# Patient Record
Sex: FEMALE | Race: WHITE | NOT HISPANIC OR LATINO | Employment: FULL TIME | URBAN - METROPOLITAN AREA
[De-identification: names, ages, dates, MRNs, and addresses within clinical notes are randomized per-mention and may not be internally consistent; named-entity substitution may affect disease eponyms.]

---

## 2017-05-06 ENCOUNTER — HOSPITAL ENCOUNTER (EMERGENCY)
Facility: CLINIC | Age: 22
Discharge: HOME | End: 2017-05-06
Attending: EMERGENCY MEDICINE | Admitting: EMERGENCY MEDICINE

## 2017-05-06 ENCOUNTER — APPOINTMENT (OUTPATIENT)
Dept: GENERAL RADIOLOGY | Facility: CLINIC | Age: 22
End: 2017-05-06
Attending: EMERGENCY MEDICINE

## 2017-05-06 ENCOUNTER — APPOINTMENT (OUTPATIENT)
Dept: CT IMAGING | Facility: CLINIC | Age: 22
End: 2017-05-06
Attending: EMERGENCY MEDICINE

## 2017-05-06 VITALS
BODY MASS INDEX: 26.58 KG/M2 | OXYGEN SATURATION: 94 % | HEIGHT: 63 IN | SYSTOLIC BLOOD PRESSURE: 114 MMHG | DIASTOLIC BLOOD PRESSURE: 77 MMHG | TEMPERATURE: 98.2 F | RESPIRATION RATE: 18 BRPM | WEIGHT: 150 LBS

## 2017-05-06 DIAGNOSIS — S20.211A CHEST WALL CONTUSION, RIGHT, INITIAL ENCOUNTER: ICD-10-CM

## 2017-05-06 DIAGNOSIS — F10.929 ALCOHOL INTOXICATION, WITH UNSPECIFIED COMPLICATION (H): ICD-10-CM

## 2017-05-06 DIAGNOSIS — S61.219A FINGER LACERATION, INITIAL ENCOUNTER: ICD-10-CM
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
## 2017-05-06 DIAGNOSIS — S06.0X0A CONCUSSION WITHOUT LOSS OF CONSCIOUSNESS, INITIAL ENCOUNTER: ICD-10-CM

## 2017-05-06 LAB
ANION GAP SERPL CALCULATED.3IONS-SCNC: 7 MMOL/L (ref 3–14)
BASOPHILS # BLD AUTO: 0 10E9/L (ref 0–0.2)
BASOPHILS NFR BLD AUTO: 0.3 %
BUN SERPL-MCNC: 9 MG/DL (ref 7–30)
CALCIUM SERPL-MCNC: 9 MG/DL (ref 8.5–10.1)
CHLORIDE SERPL-SCNC: 111 MMOL/L (ref 94–109)
CO2 SERPL-SCNC: 26 MMOL/L (ref 20–32)
CREAT SERPL-MCNC: 0.7 MG/DL (ref 0.52–1.04)
DIFFERENTIAL METHOD BLD: ABNORMAL
EOSINOPHIL # BLD AUTO: 0.4 10E9/L (ref 0–0.7)
EOSINOPHIL NFR BLD AUTO: 6.8 %
ERYTHROCYTE [DISTWIDTH] IN BLOOD BY AUTOMATED COUNT: 13.3 % (ref 10–15)
ETHANOL SERPL-MCNC: 0.22 G/DL
GFR SERPL CREATININE-BSD FRML MDRD: ABNORMAL ML/MIN/1.7M2
GLUCOSE SERPL-MCNC: 89 MG/DL (ref 70–99)
HCG SERPL QL: NEGATIVE
HCT VFR BLD AUTO: 42.4 % (ref 35–47)
HGB BLD-MCNC: 14.4 G/DL (ref 11.7–15.7)
IMM GRANULOCYTES # BLD: 0 10E9/L (ref 0–0.4)
IMM GRANULOCYTES NFR BLD: 0.2 %
INTERPRETATION ECG - MUSE: NORMAL
LYMPHOCYTES # BLD AUTO: 2.6 10E9/L (ref 0.8–5.3)
LYMPHOCYTES NFR BLD AUTO: 39.5 %
MCH RBC QN AUTO: 27.6 PG (ref 26.5–33)
MCHC RBC AUTO-ENTMCNC: 34 G/DL (ref 31.5–36.5)
MCV RBC AUTO: 81 FL (ref 78–100)
MONOCYTES # BLD AUTO: 0.3 10E9/L (ref 0–1.3)
MONOCYTES NFR BLD AUTO: 4 %
NEUTROPHILS # BLD AUTO: 3.2 10E9/L (ref 1.6–8.3)
NEUTROPHILS NFR BLD AUTO: 49.2 %
NRBC # BLD AUTO: 0 10*3/UL
NRBC BLD AUTO-RTO: 0 /100
PLATELET # BLD AUTO: 224 10E9/L (ref 150–450)
POTASSIUM SERPL-SCNC: 3.5 MMOL/L (ref 3.4–5.3)
RBC # BLD AUTO: 5.22 10E12/L (ref 3.8–5.2)
SODIUM SERPL-SCNC: 144 MMOL/L (ref 133–144)
WBC # BLD AUTO: 6.5 10E9/L (ref 4–11)

## 2017-05-06 PROCEDURE — 96361 HYDRATE IV INFUSION ADD-ON: CPT

## 2017-05-06 PROCEDURE — 71101 X-RAY EXAM UNILAT RIBS/CHEST: CPT | Mod: RT

## 2017-05-06 PROCEDURE — 99285 EMERGENCY DEPT VISIT HI MDM: CPT | Mod: 25

## 2017-05-06 PROCEDURE — 85025 COMPLETE CBC W/AUTO DIFF WBC: CPT | Performed by: EMERGENCY MEDICINE

## 2017-05-06 PROCEDURE — 80048 BASIC METABOLIC PNL TOTAL CA: CPT | Performed by: EMERGENCY MEDICINE

## 2017-05-06 PROCEDURE — 96374 THER/PROPH/DIAG INJ IV PUSH: CPT

## 2017-05-06 PROCEDURE — 80320 DRUG SCREEN QUANTALCOHOLS: CPT | Performed by: EMERGENCY MEDICINE

## 2017-05-06 PROCEDURE — 70450 CT HEAD/BRAIN W/O DYE: CPT

## 2017-05-06 PROCEDURE — 84703 CHORIONIC GONADOTROPIN ASSAY: CPT | Performed by: EMERGENCY MEDICINE

## 2017-05-06 PROCEDURE — 93005 ELECTROCARDIOGRAM TRACING: CPT

## 2017-05-06 PROCEDURE — 25000128 H RX IP 250 OP 636: Performed by: EMERGENCY MEDICINE

## 2017-05-06 RX ORDER — KETOROLAC TROMETHAMINE 30 MG/ML
30 INJECTION, SOLUTION INTRAMUSCULAR; INTRAVENOUS ONCE
Status: COMPLETED | OUTPATIENT
Start: 2017-05-06 | End: 2017-05-06

## 2017-05-06 RX ADMIN — KETOROLAC TROMETHAMINE 30 MG: 30 INJECTION, SOLUTION INTRAMUSCULAR at 02:40

## 2017-05-06 RX ADMIN — SODIUM CHLORIDE 1000 ML: 9 INJECTION, SOLUTION INTRAVENOUS at 02:05

## 2017-05-06 ASSESSMENT — ENCOUNTER SYMPTOMS
WEAKNESS: 0
NUMBNESS: 0
HEADACHES: 1
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
## 2017-05-06 NOTE — ED NOTES
Pt ambulated down hallway. Pt walked with a steady gait approximately 50ft. Dr. Simeon was alerted.
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
## 2017-05-06 NOTE — ED AVS SNAPSHOT
Emergency Department    [address] [facility] [address]    Phone:  [phone]    Fax:  [phone]                                       Jackeline Colin   MRN: [id number]    Department:   Emergency Department   Date of Visit:  5/6/2017           After Visit Summary Signature Page     I have received my discharge instructions, and my questions have been answered. I have discussed any challenges I see with this plan with the nurse or doctor.    ..........................................................................................................................................  Patient/Patient Representative Signature      ..........................................................................................................................................  Patient Representative Print Name and Relationship to Patient    ..................................................               ................................................  Date                                            Time    ..........................................................................................................................................  Reviewed by Signature/Title    ...................................................              ..............................................  Date                                                            Time

## 2017-05-12 ENCOUNTER — HOSPITAL ENCOUNTER (EMERGENCY)
Facility: CLINIC | Age: 22
Discharge: HOME | End: 2017-05-12
Attending: EMERGENCY MEDICINE | Admitting: EMERGENCY MEDICINE
Payer: COMMERCIAL

## 2017-05-12 VITALS
DIASTOLIC BLOOD PRESSURE: 80 MMHG | HEIGHT: 64 IN | RESPIRATION RATE: 18 BRPM | SYSTOLIC BLOOD PRESSURE: 120 MMHG | OXYGEN SATURATION: 96 % | HEART RATE: 93 BPM | TEMPERATURE: 97.5 F

## 2017-05-12 DIAGNOSIS — J45.901 ASTHMA EXACERBATION: ICD-10-CM

## 2017-05-12 DIAGNOSIS — Z76.0 ENCOUNTER FOR MEDICATION REFILL: ICD-10-CM

## 2017-05-12 PROCEDURE — 94640 AIRWAY INHALATION TREATMENT: CPT

## 2017-05-12 PROCEDURE — 25000125 ZZHC RX 250

## 2017-05-12 PROCEDURE — 25000132 ZZH RX MED GY IP 250 OP 250 PS 637: Performed by: EMERGENCY MEDICINE

## 2017-05-12 PROCEDURE — 99283 EMERGENCY DEPT VISIT LOW MDM: CPT | Mod: 25

## 2017-05-12 RX ORDER — IPRATROPIUM BROMIDE AND ALBUTEROL SULFATE 2.5; .5 MG/3ML; MG/3ML
3 SOLUTION RESPIRATORY (INHALATION) ONCE
Status: COMPLETED | OUTPATIENT
Start: 2017-05-12 | End: 2017-05-12

## 2017-05-12 RX ORDER — CETIRIZINE HYDROCHLORIDE 10 MG/1
10 TABLET ORAL ONCE
Status: COMPLETED | OUTPATIENT
Start: 2017-05-12 | End: 2017-05-12

## 2017-05-12 RX ORDER — DIPHENHYDRAMINE HCL 25 MG
50 CAPSULE ORAL ONCE
Status: COMPLETED | OUTPATIENT
Start: 2017-05-12 | End: 2017-05-12

## 2017-05-12 RX ORDER — IPRATROPIUM BROMIDE AND ALBUTEROL SULFATE 2.5; .5 MG/3ML; MG/3ML
SOLUTION RESPIRATORY (INHALATION)
Status: COMPLETED
Start: 2017-05-12 | End: 2017-05-12

## 2017-05-12 RX ORDER — ALBUTEROL SULFATE 90 UG/1
2 AEROSOL, METERED RESPIRATORY (INHALATION) EVERY 4 HOURS PRN
Qty: 1 INHALER | Refills: 0 | Status: SHIPPED | OUTPATIENT
Start: 2017-05-12 | End: 2019-10-29

## 2017-05-12 RX ADMIN — CETIRIZINE HYDROCHLORIDE 10 MG: 10 TABLET, FILM COATED ORAL at 05:00

## 2017-05-12 RX ADMIN — IPRATROPIUM BROMIDE AND ALBUTEROL SULFATE 3 ML: .5; 3 SOLUTION RESPIRATORY (INHALATION) at 04:37

## 2017-05-12 RX ADMIN — DIPHENHYDRAMINE HYDROCHLORIDE 25 MG: 25 CAPSULE ORAL at 04:49

## 2017-05-12 RX ADMIN — IPRATROPIUM BROMIDE AND ALBUTEROL SULFATE 3 ML: 2.5; .5 SOLUTION RESPIRATORY (INHALATION) at 04:37

## 2017-05-12 ASSESSMENT — ENCOUNTER SYMPTOMS
FEVER: 0
MYALGIAS: 0
CHILLS: 0
SHORTNESS OF BREATH: 1
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
## 2017-05-12 NOTE — DISCHARGE INSTRUCTIONS
"  Asthma (Adult)  Asthma is a disease where the medium and  small air passages within the lung go into spasm and restrict the flow of air. Inflammation and swelling of the airways cause further restriction. During an acute asthma attack, these factors cause difficulty breathing, wheezing, cough and chest tightness.    An asthma attack can be triggered by many things. Common triggers include infections such as the common cold, bronchitis, pneumonia. Irritants such as smoke or pollutants in the air, emotional upset, and exercise can also trigger an attack. In many adults with asthma, allergies to dust, mold, pollen and animal dander can cause an asthma attack. Skipping doses of daily asthma medicine can also bring on an asthma attack.  Asthma can be controlled using the proper medicines prescribed by your healthcare provider and avoiding exposure to known triggers including allergens and irritants.  Home care    Take prescribed medicine exactly at the times advised. If you need medicine such as from a hand held inhaler or aerosol breathing machine more than every 4 hours, contact your healthcare provider or seek immediate medical attention. If prescribed an antibiotic or prednisone, take all of the medicine as prescribed, even if you are feeling better after a few days.    Do not smoke. Avoid being exposed to the smoke of others.    Some people with asthma have worsening of their symptoms when they take aspirin and non-steroidal or fever-reducing medicines like ibuprofen and naproxen. Talk to your healthcare provider if you think this may apply to you.  Follow-up care  Follow up with your healthcare provider, or as advised. Always bring all of your current medicines to any appointments with your healthcare provider. Also bring a complete list of medications even those not taken for asthma. If you do not already have one, talk to your healthcare provider about developing a personalized \"Asthma Action Plan.\"  A " pneumococcal (pneumonia) vaccine and yearly flu shot (every fall) are recommended. Ask your doctor about this.  When to seek medical advice  Call your healthcare provider right away if any of these occur:     Increased wheezing or shortness of breath    Need to use your inhalers more often than usual without relief    Fever of 100.4 F (38 C) or higher, or as directed by your healthcare provider    Coughing up lots of dark-colored or bloody sputum (mucus)    Chest pain with each breath    If you use a peak flow meter as part of an Asthma Action Plan, and you are still in the yellow zone (50% to 80%) 15 minutes after using inhaler medicine.  Call 911  Call 911 if any of the following occur    Trouble walking or talking because of shortness of breath    If you use a peak flow meter as part of an Asthma Action Plan and you are still in the red zone (less than 50%) 15 minutes after using inhaler medicine    Lips or fingernails turning gray or blue    9207-6679 [employer]. [address] [place] [address]. All rights reserved. This information is not intended as a substitute for professional medical care. Always follow your healthcare professional's instructions.          Controlling Asthma Triggers: Animals     Keep pets off beds and upholstered furniture.     Having allergies to animals can trigger asthma flare-ups. Your health care provider may test you for allergies to several types of animals. The allergies are caused by an animal s dander (dry skin flakes), feathers, droppings, urine, and saliva. If you are allergic to pets, there are some things you can do to lessen your symptoms.  Keeping Your Home Clean    Dust often with a damp cloth to lessen pet dander.    Keep your pets off of your bed and out of your bedroom. You spend a big part of each day there sleeping.    Keep your pets off of upholstered furniture, like sofas and chairs, and out of rooms with carpeting.    Use a special bag for  your vacuum or use a vacuum designed to lessen pet dander and hair on carpeting.    Think about buying a home air  with a HEPA (high-efficiency particulate air) filter. They help to remove allergens in the air.  Away From Home    You may have to avoid homes with pets. Even if the pets are outdoors during a visit, the dander remains.    When spending the night somewhere, ask to sleep in a room where pets aren't allowed.  Choosing a Pet  If you are considering a new pet, there are some things you should know:    For people with allergies to dogs and cats, it is not the length of the fur or coat or the amount of shedding that matters. All dogs and cats have dander. There aren't dogs and cats that are completely allergen-free.    Fish and reptiles do not cause allergies.    0835-6891 [employer]. [address] [place] [address], [place] [address]. All rights reserved. This information is not intended as a substitute for professional medical care. Always follow your healthcare professional's instructions.

## 2017-05-12 NOTE — ED AVS SNAPSHOT
Emergency Department    [address] [facility] [address]    Phone:  [phone]    Fax:  [phone]                                       Jackeline Colin   MRN: [id number]    Department:   Emergency Department   Date of Visit:  5/12/2017           Patient Information     Date Of Birth          1995        Your diagnoses for this visit were:     Asthma exacerbation     Encounter for medication refill        You were seen by Abner Headley MD.      Follow-up Information     Follow up with Clinic, [facility]. Schedule an appointment as soon as possible for a visit in 1 week.    Contact information:    [address]  [facility] [id number] [phone]          Follow up with  Emergency Department.    Specialty:  EMERGENCY MEDICINE    Why:  If symptoms worsen    Contact information:    [address] [facility] [id number] [phone]        Discharge Instructions         Asthma (Adult)  Asthma is a disease where the medium and  small air passages within the lung go into spasm and restrict the flow of air. Inflammation and swelling of the airways cause further restriction. During an acute asthma attack, these factors cause difficulty breathing, wheezing, cough and chest tightness.    An asthma attack can be triggered by many things. Common triggers include infections such as the common cold, bronchitis, pneumonia. Irritants such as smoke or pollutants in the air, emotional upset, and exercise can also trigger an attack. In many adults with asthma, allergies to dust, mold, pollen and animal dander can cause an asthma attack. Skipping doses of daily asthma medicine can also bring on an asthma attack.  Asthma can be controlled using the proper medicines prescribed by your healthcare provider and avoiding exposure to known triggers including allergens and irritants.  Home care    Take prescribed medicine exactly at the times advised. If you  "need medicine such as from a hand held inhaler or aerosol breathing machine more than every 4 hours, contact your healthcare provider or seek immediate medical attention. If prescribed an antibiotic or prednisone, take all of the medicine as prescribed, even if you are feeling better after a few days.    Do not smoke. Avoid being exposed to the smoke of others.    Some people with asthma have worsening of their symptoms when they take aspirin and non-steroidal or fever-reducing medicines like ibuprofen and naproxen. Talk to your healthcare provider if you think this may apply to you.  Follow-up care  Follow up with your healthcare provider, or as advised. Always bring all of your current medicines to any appointments with your healthcare provider. Also bring a complete list of medications even those not taken for asthma. If you do not already have one, talk to your healthcare provider about developing a personalized \"Asthma Action Plan.\"  A pneumococcal (pneumonia) vaccine and yearly flu shot (every fall) are recommended. Ask your doctor about this.  When to seek medical advice  Call your healthcare provider right away if any of these occur:     Increased wheezing or shortness of breath    Need to use your inhalers more often than usual without relief    Fever of 100.4 F (38 C) or higher, or as directed by your healthcare provider    Coughing up lots of dark-colored or bloody sputum (mucus)    Chest pain with each breath    If you use a peak flow meter as part of an Asthma Action Plan, and you are still in the yellow zone (50% to 80%) 15 minutes after using inhaler medicine.  Call 911  Call 911 if any of the following occur    Trouble walking or talking because of shortness of breath    If you use a peak flow meter as part of an Asthma Action Plan and you are still in the red zone (less than 50%) 15 minutes after using inhaler medicine    Lips or fingernails turning gray or blue    8599-5167 [employer], " [employer] [address] [place] [address]. All rights reserved. This information is not intended as a substitute for professional medical care. Always follow your healthcare professional's instructions.          Controlling Asthma Triggers: Animals     Keep pets off beds and upholstered furniture.     Having allergies to animals can trigger asthma flare-ups. Your health care provider may test you for allergies to several types of animals. The allergies are caused by an animal s dander (dry skin flakes), feathers, droppings, urine, and saliva. If you are allergic to pets, there are some things you can do to lessen your symptoms.  Keeping Your Home Clean    Dust often with a damp cloth to lessen pet dander.    Keep your pets off of your bed and out of your bedroom. You spend a big part of each day there sleeping.    Keep your pets off of upholstered furniture, like sofas and chairs, and out of rooms with carpeting.    Use a special bag for your vacuum or use a vacuum designed to lessen pet dander and hair on carpeting.    Think about buying a home air  with a HEPA (high-efficiency particulate air) filter. They help to remove allergens in the air.  Away From Home    You may have to avoid homes with pets. Even if the pets are outdoors during a visit, the dander remains.    When spending the night somewhere, ask to sleep in a room where pets aren't allowed.  Choosing a Pet  If you are considering a new pet, there are some things you should know:    For people with allergies to dogs and cats, it is not the length of the fur or coat or the amount of shedding that matters. All dogs and cats have dander. There aren't dogs and cats that are completely allergen-free.    Fish and reptiles do not cause allergies.    1332-8276 [employer]. [address] [place] [address]. All rights reserved. This information is not intended as a substitute for professional medical care. Always follow your  healthcare professional's instructions.          24 Hour Appointment Hotline       To make an appointment at any [facility], call [phone] ([phone]). If you don't have a family doctor or clinic, we will help you find one. [facility] are conveniently located to serve the needs of you and your family.             Review of your medicines      CONTINUE these medicines which may have CHANGED, or have new prescriptions. If we are uncertain of the size of tablets/capsules you have at home, strength may be listed as something that might have changed.        Dose / Directions Last dose taken    * albuterol 108 (90 BASE) MCG/ACT Inhaler   Commonly known as:  albuterol   Dose:  2 puff   What changed:  Another medication with the same name was added. Make sure you understand how and when to take each.   Quantity:  1 Inhaler        Inhale 2 puffs into the lungs 4 times daily as needed for shortness of breath / dyspnea or wheezing   Refills:  0        * albuterol 108 (90 BASE) MCG/ACT Inhaler   Commonly known as:  albuterol   Dose:  2 puff   What changed:  You were already taking a medication with the same name, and this prescription was added. Make sure you understand how and when to take each.   Quantity:  1 Inhaler        Inhale 2 puffs into the lungs every 4 hours as needed for shortness of breath / dyspnea   Refills:  0        * Notice:  This list has 2 medication(s) that are the same as other medications prescribed for you. Read the directions carefully, and ask your doctor or other care provider to review them with you.            Prescriptions were sent or printed at these locations (1 Prescription)                   Other Prescriptions                Printed at Department/Unit printer (1 of 1)         albuterol (ALBUTEROL) 108 (90 BASE) MCG/ACT Inhaler                Orders Needing Specimen Collection     None      Pending Results     No orders found from 5/10/2017 to 5/13/2017.             Pending Culture Results     No orders found from 5/10/2017 to 5/13/2017.            Pending Results Instructions     If you had any lab results that were not finalized at the time of your Discharge, you can call the ED Lab Result RN at [phone]. You will be contacted by this team for any positive Lab results or changes in treatment. The nurses are available 7 days a week from 10A to 6:30P.  You can leave a message 24 hours per day and they will return your call.        Test Results From Your Hospital Stay               Clinical Quality Measure: Blood Pressure Screening     Your blood pressure was checked while you were in the emergency department today. The last reading we obtained was  BP: 120/80 . Please read the guidelines below about what these numbers mean and what you should do about them.  If your systolic blood pressure (the top number) is less than 120 and your diastolic blood pressure (the bottom number) is less than 80, then your blood pressure is normal. There is nothing more that you need to do about it.  If your systolic blood pressure (the top number) is 120-139 or your diastolic blood pressure (the bottom number) is 80-89, your blood pressure may be higher than it should be. You should have your blood pressure rechecked within a year by a primary care provider.  If your systolic blood pressure (the top number) is 140 or greater or your diastolic blood pressure (the bottom number) is 90 or greater, you may have high blood pressure. High blood pressure is treatable, but if left untreated over time it can put you at risk for heart attack, stroke, or kidney failure. You should have your blood pressure rechecked by a primary care provider within the next 4 weeks.  If your provider in the emergency department today gave you specific instructions to follow-up with your doctor or provider even sooner than that, you should follow that instruction and not wait for up to 4 weeks for your follow-up  "visit.        Thank you for choosing [facility]       Thank you for choosing [facility] for your care. Our goal is always to provide you with excellent care. Hearing back from our patients is one way we can continue to improve our services. Please take a few minutes to complete the written survey that you may receive in the mail after you visit with us. Thank you!        [employer] Information     [employer] lets you send messages to your doctor, view your test results, renew your prescriptions, schedule appointments and more. To sign up, go to [email or web address] . Click on \"Log in\" on the left side of the screen, which will take you to the Welcome page. Then click on \"Sign up Now\" on the right side of the page.     You will be asked to enter the access code listed below, as well as some personal information. Please follow the directions to create your username and password.     Your access code is: [email or web address]  Expires: 2017  3:22 AM     Your access code will  in 90 days. If you need help or a new code, please call your [facility] clinic or [phone].        Care EveryWhere ID     This is your Care EveryWhere ID. This could be used by other organizations to access your [facility] medical records  [id number]        After Visit Summary       This is your record. Keep this with you and show to your community pharmacist(s) and doctor(s) at your next visit.                  "

## 2017-05-12 NOTE — ED AVS SNAPSHOT
Emergency Department    [address] [facility] [address]    Phone:  [phone]    Fax:  [phone]                                       Jackeline Colin   MRN: [id number]    Department:   Emergency Department   Date of Visit:  5/12/2017           After Visit Summary Signature Page     I have received my discharge instructions, and my questions have been answered. I have discussed any challenges I see with this plan with the nurse or doctor.    ..........................................................................................................................................  Patient/Patient Representative Signature      ..........................................................................................................................................  Patient Representative Print Name and Relationship to Patient    ..................................................               ................................................  Date                                            Time    ..........................................................................................................................................  Reviewed by Signature/Title    ...................................................              ..............................................  Date                                                            Time

## 2017-07-17 ENCOUNTER — OFFICE VISIT (OUTPATIENT)
Dept: URGENT CARE | Facility: URGENT CARE | Age: 22
End: 2017-07-17
Payer: COMMERCIAL

## 2017-07-17 VITALS
TEMPERATURE: 97.7 F | BODY MASS INDEX: 26.78 KG/M2 | SYSTOLIC BLOOD PRESSURE: 120 MMHG | HEART RATE: 106 BPM | DIASTOLIC BLOOD PRESSURE: 70 MMHG | WEIGHT: 156 LBS | OXYGEN SATURATION: 98 %

## 2017-07-17 DIAGNOSIS — J45.901 ASTHMA EXACERBATION: Primary | ICD-10-CM

## 2017-07-17 DIAGNOSIS — J30.2 SEASONAL ALLERGIC RHINITIS, UNSPECIFIED CHRONICITY, UNSPECIFIED TRIGGER: ICD-10-CM

## 2017-07-17 PROCEDURE — 94640 AIRWAY INHALATION TREATMENT: CPT | Performed by: FAMILY MEDICINE

## 2017-07-17 PROCEDURE — 99214 OFFICE O/P EST MOD 30 MIN: CPT | Mod: 25 | Performed by: FAMILY MEDICINE

## 2017-07-17 RX ORDER — PREDNISONE 20 MG/1
20 TABLET ORAL 2 TIMES DAILY
Qty: 8 TABLET | Refills: 0 | Status: SHIPPED | OUTPATIENT
Start: 2017-07-18 | End: 2017-07-22

## 2017-07-17 RX ORDER — ALBUTEROL SULFATE 0.83 MG/ML
SOLUTION RESPIRATORY (INHALATION)
Qty: 1 VIAL | Refills: 0
Start: 2017-07-17 | End: 2019-10-29

## 2017-07-17 RX ORDER — PREDNISONE 20 MG/1
TABLET ORAL
Qty: 2 TABLET | Refills: 0
Start: 2017-07-17 | End: 2017-07-18

## 2017-07-17 RX ORDER — LORATADINE 10 MG/1
10 CAPSULE, LIQUID FILLED ORAL DAILY
COMMUNITY

## 2017-07-17 NOTE — NURSING NOTE
"Chief Complaint   Patient presents with     Asthma     Pt reports possible asthma exacerbation which started last night.     Urgent Care       Initial /70  Pulse 106  Temp 97.7  F (36.5  C)  Wt 156 lb (70.8 kg)  SpO2 98%  BMI 26.78 kg/m2 Estimated body mass index is 26.78 kg/(m^2) as calculated from the following:    Height as of 5/12/17: 5' 4\" (1.626 m).    Weight as of this encounter: 156 lb (70.8 kg).  Medication Reconciliation: complete    "

## 2017-07-17 NOTE — PROGRESS NOTES
SUBJECTIVE: Jackeline Colin is a 21 year old female presenting with a chief complaint of cough  and SOB/wheezing.  Onset of symptoms was 1 day(s) ago.  Course of illness is worsening.    Severity moderately severe  Current and Associated symptoms: allergy symptoms  Treatment measures tried include left inhaler at a friends house yesterday.  Predisposing factors include HX of asthma and seasonal allergies.    Past Medical History:   Diagnosis Date     Tonsil, abscess 6/6/2014    peritonsilar abscess     Uncomplicated asthma        Past Surgical History:   Procedure Laterality Date     NO HISTORY OF SURGERY         Family History   Problem Relation Age of Onset     Hypertension Mother      Hyperlipidemia Mother        Social History   Substance Use Topics     Smoking status: Never Smoker     Smokeless tobacco: Never Used     Alcohol use Yes     Review Of Systems  Skin: negative  Eyes: negative  Ears/Nose/Throat: negative  Respiratory: as above  Cardiovascular: negative  Gastrointestinal: negative  Genitourinary: negative  Musculoskeletal: negative  Neurologic: negative  Psychiatric: negative  Hematologic/Lymphatic/Immunologic: negative  Endocrine: negative      OBJECTIVE:  /70  Pulse 106  Temp 97.7  F (36.5  C)  Wt 156 lb (70.8 kg)  SpO2 98%  BMI 26.78 kg/m2    GENERAL APPEARANCE: healthy, alert and no distress  EYES: EOMI,  PERRL, conjunctiva clear  HENT: ear canals and TM's normal.  Nose and mouth without ulcers, erythema or lesions  NECK: supple, nontender, no lymphadenopathy  RESP: expiratory wheezes throughout  CV: regular rates and rhythm, normal S1 S2, no murmur noted  SKIN: no suspicious lesions or rashes      ICD-10-CM    1. Asthma exacerbation J45.901 albuterol (2.5 MG/3ML) 0.083% neb solution     INHALATION/NEBULIZER TREATMENT, INITIAL     predniSONE (DELTASONE) 20 MG tablet   2. Seasonal allergic rhinitis, unspecified chronicity, unspecified trigger J30.2      Pt will retrieve alb  inhaler this am  She felt much improved at time of dc and there was less wheezing  Fluids/Rest, f/u if worse/not any better  She will switch her claritin to Zyrtec or Allegra

## 2017-07-17 NOTE — MR AVS SNAPSHOT
"              After Visit Summary   2017    Jackeline Colin    MRN: [id number]           Patient Information     Date Of Birth          1995        Visit Information        Provider Department      2017 10:05 AM Db Almeida DO [facility]        Today's Diagnoses     Asthma exacerbation    -  1    Seasonal allergic rhinitis, unspecified chronicity, unspecified trigger           Follow-ups after your visit        Who to contact     If you have questions or need follow up information about today's clinic visit or your schedule please contact [facility] directly at [phone].  Normal or non-critical lab and imaging results will be communicated to you by walihart, letter or phone within 4 business days after the clinic has received the results. If you do not hear from us within 7 days, please contact the clinic through walihart or phone. If you have a critical or abnormal lab result, we will notify you by phone as soon as possible.  Submit refill requests through [employer] or call your pharmacy and they will forward the refill request to us. Please allow 3 business days for your refill to be completed.          Additional Information About Your Visit        MyChart Information     [employer] lets you send messages to your doctor, view your test results, renew your prescriptions, schedule appointments and more. To sign up, go to [email or web address] . Click on \"Log in\" on the left side of the screen, which will take you to the Welcome page. Then click on \"Sign up Now\" on the right side of the page.     You will be asked to enter the access code listed below, as well as some personal information. Please follow the directions to create your username and password.     Your access code is: [email or web address]  Expires: 2017  3:22 AM     Your access code will  in 90 days. If you need help or a new code, please call your [place] clinic or " [phone].        Care EveryWhere ID     This is your Care EveryWhere ID. This could be used by other organizations to access your [facility] medical records  [id number]        Your Vitals Were     Pulse Temperature Pulse Oximetry BMI (Body Mass Index)          106 97.7  F (36.5  C) 98% 26.78 kg/m2         Blood Pressure from Last 3 Encounters:   07/17/17 120/70   05/12/17 120/80   05/06/17 114/77    Weight from Last 3 Encounters:   07/17/17 156 lb (70.8 kg)   05/06/17 150 lb (68 kg)   12/12/16 140 lb (63.5 kg)              We Performed the Following     INHALATION/NEBULIZER TREATMENT, INITIAL          Today's Medication Changes          These changes are accurate as of: 7/17/17 10:45 AM.  If you have any questions, ask your nurse or doctor.               Start taking these medicines.        Dose/Directions    * predniSONE 20 MG tablet   Commonly known as:  DELTASONE   Used for:  Asthma exacerbation   Started by:  Db Almeida DO        40mg prednisone in clinic   Quantity:  2 tablet   Refills:  0       * predniSONE 20 MG tablet   Commonly known as:  DELTASONE   Used for:  Asthma exacerbation   Started by:  Db Almeida DO        Dose:  20 mg   Start taking on:  7/18/2017   Take 1 tablet (20 mg) by mouth 2 times daily for 4 days   Quantity:  8 tablet   Refills:  0       * Notice:  This list has 2 medication(s) that are the same as other medications prescribed for you. Read the directions carefully, and ask your doctor or other care provider to review them with you.      These medicines have changed or have updated prescriptions.        Dose/Directions    * albuterol 108 (90 BASE) MCG/ACT Inhaler   Commonly known as:  albuterol   This may have changed:  Another medication with the same name was added. Make sure you understand how and when to take each.        Dose:  2 puff   Inhale 2 puffs into the lungs every 4 hours as needed for shortness of breath / dyspnea   Quantity:  1 Inhaler   Refills:  0        * albuterol (2.5 MG/3ML) 0.083% neb solution   This may have changed:  You were already taking a medication with the same name, and this prescription was added. Make sure you understand how and when to take each.   Used for:  Asthma exacerbation   Changed by:  Db Almeida, DO        1 neb in clinic   Quantity:  1 vial   Refills:  0       * Notice:  This list has 2 medication(s) that are the same as other medications prescribed for you. Read the directions carefully, and ask your doctor or other care provider to review them with you.         Where to get your medicines      These medications were sent to [facility]/pharmacy #[id number] - [place], [place] - [address]  [address] [address]     Phone:  [phone]     predniSONE 20 MG tablet         Some of these will need a paper prescription and others can be bought over the counter.  Ask your nurse if you have questions.     You don't need a prescription for these medications     albuterol (2.5 MG/3ML) 0.083% neb solution    predniSONE 20 MG tablet                Primary Care Provider Office Phone # Fax #    [facility] [phone]       [address]  [facility] [address]        Equal Access to Services     KINGSLEY THOMAS AH: Hadii florentino tavarez hadasho Soomaali, waaxda luqadaha, qaybta kaalmada adeegyada, jemal reza. So [facility] [phone].    ATENCIÓN: Si habla español, tiene a godinez disposición servicios gratuitos de asistencia lingüística. Llame al [phone].    We comply with applicable federal civil rights laws and [place] laws. We do not discriminate on the basis of race, color, national origin, age, disability sex, sexual orientation or gender identity.            Thank you!     Thank you for choosing [facility]  for your care. Our goal is always to provide you with excellent care. Hearing back from our patients is one way we can continue to improve our  services. Please take a few minutes to complete the written survey that you may receive in the mail after your visit with us. Thank you!             Your Updated Medication List - Protect others around you: Learn how to safely use, store and throw away your medicines at www.disposemymeds.org.          This list is accurate as of: 7/17/17 10:45 AM.  Always use your most recent med list.                   Brand Name Dispense Instructions for use Diagnosis    * albuterol 108 (90 BASE) MCG/ACT Inhaler    albuterol    1 Inhaler    Inhale 2 puffs into the lungs every 4 hours as needed for shortness of breath / dyspnea        * albuterol (2.5 MG/3ML) 0.083% neb solution     1 vial    1 neb in clinic    Asthma exacerbation       CLARITIN 10 MG capsule   Generic drug:  loratadine      Take 10 mg by mouth daily        * predniSONE 20 MG tablet    DELTASONE    2 tablet    40mg prednisone in clinic    Asthma exacerbation       * predniSONE 20 MG tablet   Start taking on:  7/18/2017    DELTASONE    8 tablet    Take 1 tablet (20 mg) by mouth 2 times daily for 4 days    Asthma exacerbation       * Notice:  This list has 4 medication(s) that are the same as other medications prescribed for you. Read the directions carefully, and ask your doctor or other care provider to review them with you.

## 2017-09-22 ENCOUNTER — HOSPITAL ENCOUNTER (EMERGENCY)
Facility: CLINIC | Age: 22
Discharge: HOME | End: 2017-09-22
Attending: NURSE PRACTITIONER | Admitting: NURSE PRACTITIONER
Payer: COMMERCIAL

## 2017-09-22 VITALS
OXYGEN SATURATION: 96 % | DIASTOLIC BLOOD PRESSURE: 87 MMHG | HEART RATE: 105 BPM | RESPIRATION RATE: 16 BRPM | SYSTOLIC BLOOD PRESSURE: 135 MMHG | TEMPERATURE: 98.1 F

## 2017-09-22 DIAGNOSIS — J06.9 URI WITH COUGH AND CONGESTION: ICD-10-CM

## 2017-09-22 DIAGNOSIS — H66.90 ACUTE OTITIS MEDIA, UNSPECIFIED LATERALITY, UNSPECIFIED OTITIS MEDIA TYPE: ICD-10-CM

## 2017-09-22 PROCEDURE — 99282 EMERGENCY DEPT VISIT SF MDM: CPT

## 2017-09-22 RX ORDER — AMOXICILLIN 500 MG/1
500 CAPSULE ORAL 3 TIMES DAILY
Qty: 21 CAPSULE | Refills: 0 | Status: SHIPPED | OUTPATIENT
Start: 2017-09-22 | End: 2017-09-29

## 2017-09-22 ASSESSMENT — ENCOUNTER SYMPTOMS
COUGH: 1
FEVER: 0
SORE THROAT: 0
NECK PAIN: 0
ABDOMINAL PAIN: 0
SINUS PRESSURE: 1

## 2017-09-22 NOTE — ED AVS SNAPSHOT
Emergency Department    [address] [address]    Phone:  [phone]    Fax:  [phone]                                       Jackeline Colin   MRN: [id number]    Department:   Emergency Department   Date of Visit:  9/22/2017           After Visit Summary Signature Page     I have received my discharge instructions, and my questions have been answered. I have discussed any challenges I see with this plan with the nurse or doctor.    ..........................................................................................................................................  Patient/Patient Representative Signature      ..........................................................................................................................................  Patient Representative Print Name and Relationship to Patient    ..................................................               ................................................  Date                                            Time    ..........................................................................................................................................  Reviewed by Signature/Title    ...................................................              ..............................................  Date                                                            Time

## 2017-09-22 NOTE — ED AVS SNAPSHOT
Emergency Department    [address] [facility] [address]    Phone:  [phone]    Fax:  [phone]                                       Jackeline Colin   MRN: [id number]    Department:   Emergency Department   Date of Visit:  9/22/2017           Patient Information     Date Of Birth          1995        Your diagnoses for this visit were:     URI with cough and congestion     Acute otitis media, unspecified laterality, unspecified otitis media type right       You were seen by Ghazal Zaman, CNP.      Follow-up Information     Follow up with Clinic, [facility] In 1 week.    [address]:    [address]  [facility] [address] [phone]          Follow up with  Emergency Department.    Specialty:  EMERGENCY MEDICINE    Why:  As needed, If symptoms worsen    Contact information:    [address] [facility] [id number] [phone]        Discharge Instructions         Decongestants may help you feel better. Y ou may use decongestant nose sprays Afrin  (oxymetazoline) or Prasanth-Synephrine  (phenylephrine hydrochloride) for up to 3 days, or may use a decongestant tablet like Sudafed  (pseudoephedrine).    You may also use Saline Nasal Spray 3-4 times daily for 1 week.       Otitis Media (Middle-Ear Infection) in Adults  Otitis media is another name for a middle-ear infection. It means an infection behind your eardrum. This kind of ear infection can happen after any condition that keeps fluid from draining from the middle ear. These conditions include allergies, a cold, a sore throat, or a respiratory infection.  Middle-ear infections are common in children, but they can also happen in adults. An ear infection in an adult may mean a more serious problem than in a child. So you may need additional tests. If you have an ear infection, you should see your health care provider for treatment.  What are the types of middle-ear  infections?  Infections can affect the middle ear in several ways. They are:    Acute otitis media. This middle-ear infection occurs suddenly. It causes swelling and redness. Fluid and mucus become trapped inside the ear. You can have a fever and ear pain.    Otitis media with effusion. Fluid (effusion) and mucus build up in the middle ear after the infection goes away. You may feel like your middle ear is full. This can continue for months and may affect your hearing.    Chronic otitis media with effusion. Fluid (effusion) remains in the middle ear for a long time. Or it builds up again and again, even though there is no infection. This type of middle-ear infection may be hard to treat. It may also affect your hearing.  Who is more likely to get a middle-ear infection?  You are more likely to get an ear infection if you:    Smoke or are around someone who smokes    Have seasonal or year-round allergy symptoms    Have a cold or other upper respiratory infection  What causes a middle-ear infection?  The middle ear connects to the throat by a canal called the eustachian tube. This tube helps even out the pressure between the outer ear and the inner ear. A cold or allergy can irritate the tube or cause the area around it to swell. This can keep fluid from draining from the middle ear. The fluid builds up behind the eardrum. Bacteria and viruses can grow in this fluid. The bacteria and viruses cause the middle-ear infection.  What are the symptoms of a middle-ear infection?  Common symptoms of a middle-ear infection in adults are:    Pain in 1 or both ears    Drainage from the ear    Muffled hearing    Sore throat   You may also have a fever. Rarely, your balance can be affected.  These symptoms may be the same as for other conditions. It s important to talk with your health care provider if you think you have a middle-ear infection. If you have a high fever, severe pain behind your ear, or paralysis in your face, see  your provider as soon as you can.  How is a middle-ear infection diagnosed?  Your health care provider will take a medical history and do a physical exam. He or she will look at the outer ear and eardrum with an otoscope. The otoscope is a lighted tool that lets your provider see inside the ear. A pneumatic otoscope blows a puff of air into the ear to check how well your eardrum moves. If you eardrum doesn t move well, it may mean you have fluid behind it.  Your provider may also do a test called tympanometry. This test tells how well the middle ear is working. It can find any changes in pressure in the middle ear. Your provider may test your hearing with a tuning fork.  How is a middle-ear infection treated?  A middle-ear infection may be treated with:    Antibiotics, taken by mouth or as ear drops    Medication for pain    Decongestants, antihistamines, or nasal steroids  Your health care provider may also have you try autoinsufflation. This helps adjust the air pressure in your ear. For this, you pinch your nose and gently exhale. This forces air back through the eustachian tube.  The exact treatment for your ear infection will depend on the type of infection you have. In general, if your symptoms don t get better in 48 to 72 hours, contact your health care provider.  Middle-ear infections can cause long-term problems if not treated. They can lead to:    Infection in other parts of the head    Permanent hearing loss    Paralysis of a nerve in your face  If you have a middle-ear infection that doesn t get better, you may need to see an ear, nose, and throat specialist (otolaryngologist). You may need a CT scan or MRI to check for head and neck cancer.  Ear tubes  Sometimes fluid stays in the middle ear even after you take antibiotics and the infection goes away. In this case, your health care provider may suggest that a small tube be placed in your ear. The tube is put at the opening of the eardrum. The tube keeps  fluid from building up and relieves pressure in the middle ear. It can also help you hear better. This surgery is called myringotomy. It is not often done in adults.  The tubes usually fall out on their own after 6 months to a year.    9237-2381 [employer]. [address] [place] [address]. All rights reserved. This information is not intended as a substitute for professional medical care. Always follow your healthcare professional's instructions.      Discharge Instructions  Upper Respiratory Infection    The upper respiratory tract includes the sinuses, nasal passages, pharynx, and larynx. A URI, or upper respiratory infection, is an infection of any of the parts of the upper airway. Symptoms include runny nose, congestion, sneezing, sore throat, cough, and fever. URIs are almost always caused by a virus. Antibiotics do not help with viral infections, so are generally not prescribed. A URI is very contagious through coughing and nasal secretions; make sure you wash your hands often and clean surfaces after sneezing, coughing or touching them. While you should start to improve in 3 - 5 days, remember that sometimes a cough can linger for several weeks.    Generally, every Emergency Department visit should have a follow-up clinic visit with either a primary or a specialty clinic/provider. Please follow-up as instructed by your emergency provider today.    Return to the Emergency Department if:    Any of your symptoms get much worse.    You seem very sick, like being too weak to get up.    You have chest pain or shortness of breath.     You have a severe headache.    You are vomiting (throwing up) so much you cannot keep fluids or medicines down.    You have confusion or seem unusually drowsy.    You have a seizure.    What can I do to help myself?    Fill any prescriptions the provider gave you and take them right away    If you have a fever, get plenty of rest and drink lots of fluids,  especially water.    Using a humidifier or saline nose spray will also help loosen mucous.     Clothes or blankets will not change your fever. Do what is comfortable for you.    Bathing or sponging in lukewarm water may help you feel better.    Acetaminophen (Tylenol ) or ibuprofen (Advil , Motrin ) will help bring fever down and may help you feel more comfortable. Be sure to read and follow the package directions, and ask your provider if you have questions.    Do not drink alcohol.    Decongestants may help you feel better. You may use decongestant nose sprays Afrin  (oxymetazoline) or Prasanth-Synephrine  (phenylephrine hydrochloride) for up to 3 days, or may use a decongestant tablet like Sudafed  (pseudoephedrine).  If you were given a prescription for medicine here today, be sure to read all of the information (including the package insert) that comes with your prescription.  This will include important information about the medicine, its side effects, and any warnings that you need to know about.  The pharmacist who fills the prescription can provide more information and answer questions you may have about the medicine.  If you have questions or concerns that the pharmacist cannot address, please call or return to the Emergency Department.   Remember that you can always come back to the Emergency Department if you are not able to see your regular provider in the amount of time listed above, if you get any new symptoms, or if there is anything that worries you.    24 Hour Appointment Hotline       To make an appointment at any [facility], call [phone] ([phone]). If you don't have a family doctor or clinic, we will help you find one. [facility] clinics are conveniently located to serve the needs of you and your family.             Review of your medicines      START taking        Dose / Directions Last dose taken    amoxicillin 500 MG capsule   Commonly known as:  AMOXIL   Dose:  500 mg   Quantity:   21 capsule        Take 1 capsule (500 mg) by mouth 3 times daily for 7 days   Refills:  0          Our records show that you are taking the medicines listed below. If these are incorrect, please call your family doctor or clinic.        Dose / Directions Last dose taken    albuterol 108 (90 BASE) MCG/ACT Inhaler   Commonly known as:  PROAIR HFA   Dose:  2 puff   Quantity:  1 Inhaler        Inhale 2 puffs into the lungs every 4 hours as needed for shortness of breath / dyspnea   Refills:  0        CLARITIN 10 MG capsule   Dose:  10 mg   Generic drug:  loratadine        Take 10 mg by mouth daily   Refills:  0                Prescriptions were sent or printed at these locations (1 Prescription)                   Other Prescriptions                Printed at Department/Unit printer (1 of 1)         amoxicillin (AMOXIL) 500 MG capsule                Orders Needing Specimen Collection     None      Pending Results     No orders found from 9/20/2017 to 9/23/2017.            Pending Culture Results     No orders found from 9/20/2017 to 9/23/2017.            Pending Results Instructions     If you had any lab results that were not finalized at the time of your Discharge, you can call the ED Lab Result RN at [phone]. You will be contacted by this team for any positive Lab results or changes in treatment. The nurses are available 7 days a week from 10A to 6:30P.  You can leave a message 24 hours per day and they will return your call.        Test Results From Your Hospital Stay               Clinical Quality Measure: Blood Pressure Screening     Your blood pressure was checked while you were in the emergency department today. The last reading we obtained was  BP: 135/87 . Please read the guidelines below about what these numbers mean and what you should do about them.  If your systolic blood pressure (the top number) is less than 120 and your diastolic blood pressure (the bottom number) is less than 80, then your blood  "pressure is normal. There is nothing more that you need to do about it.  If your systolic blood pressure (the top number) is 120-139 or your diastolic blood pressure (the bottom number) is 80-89, your blood pressure may be higher than it should be. You should have your blood pressure rechecked within a year by a primary care provider.  If your systolic blood pressure (the top number) is 140 or greater or your diastolic blood pressure (the bottom number) is 90 or greater, you may have high blood pressure. High blood pressure is treatable, but if left untreated over time it can put you at risk for heart attack, stroke, or kidney failure. You should have your blood pressure rechecked by a primary care provider within the next 4 weeks.  If your provider in the emergency department today gave you specific instructions to follow-up with your doctor or provider even sooner than that, you should follow that instruction and not wait for up to 4 weeks for your follow-up visit.        Thank you for choosing [facility]       Thank you for choosing [facility] for your care. Our goal is always to provide you with excellent care. Hearing back from our patients is one way we can continue to improve our services. Please take a few minutes to complete the written survey that you may receive in the mail after you visit with us. Thank you!        [employer] Information     [place] lets you send messages to your doctor, view your test results, renew your prescriptions, schedule appointments and more. To sign up, go to [email or web address] . Click on \"Log in\" on the left side of the screen, which will take you to the Welcome page. Then click on \"Sign up Now\" on the right side of the page.     You will be asked to enter the access code listed below, as well as some personal information. Please follow the directions to create your username and password.     Your access code is: [email or web address]  Expires: 12/21/2017 11:06 PM     Your access code " will  in 90 days. If you need help or a new code, please call your [place] clinic or [phone].        Care EveryWhere ID     This is your Care EveryWhere ID. This could be used by other organizations to access your [place] medical records  [id number]        Equal Access to Services     KINGSLEY THOMAS : Laura Banuelos, wagilda luqadaha, qaybjerri kaalmaamanda jaramillo, jemal reza. So [facility] [phone].    ATENCIÓN: Si habla español, tiene a godinez disposición servicios gratuitos de asistencia lingüística. Llame al [phone].    We comply with applicable federal civil rights laws and [place] laws. We do not discriminate on the basis of race, color, national origin, age, disability sex, sexual orientation or gender identity.            After Visit Summary       This is your record. Keep this with you and show to your community pharmacist(s) and doctor(s) at your next visit.

## 2017-09-23 NOTE — DISCHARGE INSTRUCTIONS
Decongestants may help you feel better. Y ou may use decongestant nose sprays Afrin  (oxymetazoline) or Prasanth-Synephrine  (phenylephrine hydrochloride) for up to 3 days, or may use a decongestant tablet like Sudafed  (pseudoephedrine).    You may also use Saline Nasal Spray 3-4 times daily for 1 week.       Otitis Media (Middle-Ear Infection) in Adults  Otitis media is another name for a middle-ear infection. It means an infection behind your eardrum. This kind of ear infection can happen after any condition that keeps fluid from draining from the middle ear. These conditions include allergies, a cold, a sore throat, or a respiratory infection.  Middle-ear infections are common in children, but they can also happen in adults. An ear infection in an adult may mean a more serious problem than in a child. So you may need additional tests. If you have an ear infection, you should see your health care provider for treatment.  What are the types of middle-ear infections?  Infections can affect the middle ear in several ways. They are:    Acute otitis media. This middle-ear infection occurs suddenly. It causes swelling and redness. Fluid and mucus become trapped inside the ear. You can have a fever and ear pain.    Otitis media with effusion. Fluid (effusion) and mucus build up in the middle ear after the infection goes away. You may feel like your middle ear is full. This can continue for months and may affect your hearing.    Chronic otitis media with effusion. Fluid (effusion) remains in the middle ear for a long time. Or it builds up again and again, even though there is no infection. This type of middle-ear infection may be hard to treat. It may also affect your hearing.  Who is more likely to get a middle-ear infection?  You are more likely to get an ear infection if you:    Smoke or are around someone who smokes    Have seasonal or year-round allergy symptoms    Have a cold or other upper respiratory infection  What  causes a middle-ear infection?  The middle ear connects to the throat by a canal called the eustachian tube. This tube helps even out the pressure between the outer ear and the inner ear. A cold or allergy can irritate the tube or cause the area around it to swell. This can keep fluid from draining from the middle ear. The fluid builds up behind the eardrum. Bacteria and viruses can grow in this fluid. The bacteria and viruses cause the middle-ear infection.  What are the symptoms of a middle-ear infection?  Common symptoms of a middle-ear infection in adults are:    Pain in 1 or both ears    Drainage from the ear    Muffled hearing    Sore throat   You may also have a fever. Rarely, your balance can be affected.  These symptoms may be the same as for other conditions. It s important to talk with your health care provider if you think you have a middle-ear infection. If you have a high fever, severe pain behind your ear, or paralysis in your face, see your provider as soon as you can.  How is a middle-ear infection diagnosed?  Your health care provider will take a medical history and do a physical exam. He or she will look at the outer ear and eardrum with an otoscope. The otoscope is a lighted tool that lets your provider see inside the ear. A pneumatic otoscope blows a puff of air into the ear to check how well your eardrum moves. If you eardrum doesn t move well, it may mean you have fluid behind it.  Your provider may also do a test called tympanometry. This test tells how well the middle ear is working. It can find any changes in pressure in the middle ear. Your provider may test your hearing with a tuning fork.  How is a middle-ear infection treated?  A middle-ear infection may be treated with:    Antibiotics, taken by mouth or as ear drops    Medication for pain    Decongestants, antihistamines, or nasal steroids  Your health care provider may also have you try autoinsufflation. This helps adjust the air  pressure in your ear. For this, you pinch your nose and gently exhale. This forces air back through the eustachian tube.  The exact treatment for your ear infection will depend on the type of infection you have. In general, if your symptoms don t get better in 48 to 72 hours, contact your health care provider.  Middle-ear infections can cause long-term problems if not treated. They can lead to:    Infection in other parts of the head    Permanent hearing loss    Paralysis of a nerve in your face  If you have a middle-ear infection that doesn t get better, you may need to see an ear, nose, and throat specialist (otolaryngologist). You may need a CT scan or MRI to check for head and neck cancer.  Ear tubes  Sometimes fluid stays in the middle ear even after you take antibiotics and the infection goes away. In this case, your health care provider may suggest that a small tube be placed in your ear. The tube is put at the opening of the eardrum. The tube keeps fluid from building up and relieves pressure in the middle ear. It can also help you hear better. This surgery is called myringotomy. It is not often done in adults.  The tubes usually fall out on their own after 6 months to a year.    3782-8999 [employer]. [address] [place] [address], [place] [address]. All rights reserved. This information is not intended as a substitute for professional medical care. Always follow your healthcare professional's instructions.      Discharge Instructions  Upper Respiratory Infection    The upper respiratory tract includes the sinuses, nasal passages, pharynx, and larynx. A URI, or upper respiratory infection, is an infection of any of the parts of the upper airway. Symptoms include runny nose, congestion, sneezing, sore throat, cough, and fever. URIs are almost always caused by a virus. Antibiotics do not help with viral infections, so are generally not prescribed. A URI is very contagious through coughing and nasal  secretions; make sure you wash your hands often and clean surfaces after sneezing, coughing or touching them. While you should start to improve in 3 - 5 days, remember that sometimes a cough can linger for several weeks.    Generally, every Emergency Department visit should have a follow-up clinic visit with either a primary or a specialty clinic/provider. Please follow-up as instructed by your emergency provider today.    Return to the Emergency Department if:    Any of your symptoms get much worse.    You seem very sick, like being too weak to get up.    You have chest pain or shortness of breath.     You have a severe headache.    You are vomiting (throwing up) so much you cannot keep fluids or medicines down.    You have confusion or seem unusually drowsy.    You have a seizure.    What can I do to help myself?    Fill any prescriptions the provider gave you and take them right away    If you have a fever, get plenty of rest and drink lots of fluids, especially water.    Using a humidifier or saline nose spray will also help loosen mucous.     Clothes or blankets will not change your fever. Do what is comfortable for you.    Bathing or sponging in lukewarm water may help you feel better.    Acetaminophen (Tylenol ) or ibuprofen (Advil , Motrin ) will help bring fever down and may help you feel more comfortable. Be sure to read and follow the package directions, and ask your provider if you have questions.    Do not drink alcohol.    Decongestants may help you feel better. You may use decongestant nose sprays Afrin  (oxymetazoline) or Prasanth-Synephrine  (phenylephrine hydrochloride) for up to 3 days, or may use a decongestant tablet like Sudafed  (pseudoephedrine).  If you were given a prescription for medicine here today, be sure to read all of the information (including the package insert) that comes with your prescription.  This will include important information about the medicine, its side effects, and any  warnings that you need to know about.  The pharmacist who fills the prescription can provide more information and answer questions you may have about the medicine.  If you have questions or concerns that the pharmacist cannot address, please call or return to the Emergency Department.   Remember that you can always come back to the Emergency Department if you are not able to see your regular provider in the amount of time listed above, if you get any new symptoms, or if there is anything that worries you.

## 2017-09-23 NOTE — ED PROVIDER NOTES
History     Chief Complaint:  Ear Pain    HPI   Jackeline Colin is a 21 year old female who presents with ear pain. The patient reports that she began feeling like her right ear was clogged and painful yesterday and she felt like she could not hear. She states that her allergies always get bad around this time of year and they have been worsening lately with congestion for greater than 2 weeks. She notes that her worsening sinus symptoms began today, stating that she has a cough and congestion. The patient denies sore throat, fever, neck pain or abdominal pain. 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 The patient notes that she took Tylenol last night which temporarily improved her symptoms. She denies headache, neck pain or stiffness, abdominal pain, nausea or vomiting.    Allergies:  Cats  Dust Mites  Seasonal allergies    Medications:    Claritin  Albuterol    Past Medical History:    Tonsil, abscess  Peritonsillar abscess  Uncomplicated asthma    Past Surgical History:    History reviewed. No significant past surgical history.     Family History:    Hypertension  Hyperlipidemia    Social History:  Relationship status: Single  Tobacco use: Neg  Alcohol use: Pos  The patient presents Alone.     Review of Systems   Constitutional: Negative for fever.   HENT: Positive for congestion, ear pain (Right) and sinus pressure. Negative for sore throat.    Respiratory: Positive for cough.    Gastrointestinal: Negative for abdominal pain.   Musculoskeletal: Negative for neck pain.   All other systems reviewed and are negative.    Physical Exam     Patient Vitals for the past 24 hrs:   BP Temp Temp src Pulse Resp SpO2   09/22/17 2247 135/87 98.1  F (36.7  C) Oral 105 16 96 %     Physical Exam  Nursing notes reviewed. Vitals reviewed.  General: Alert. Well kept.  Eyes:  Conjunctiva non-injected, non-icteric.  Ears: right TM erythematous and bulging with no mastoid  tenderness and no pain with movement of the tragus.  Left TM normal.   Neck/Throat: Moist mucous membranes. Normal voice. Tonsils 2+ bilateral with uvula midline. No nuchal rigidity.   Cardiac: Regular rhythm. Normal heart sounds.  Pulmonary: Clear and equal breath sounds bilaterally.   Musculoskeletal: Normal gross range of motion of all 4 extremities.   Neurological: Alert and oriented x4.   Skin: Warm and dry. Normal appearance of visualized exposed skin without rashes or petechiae.  Psych: Affect normal. Good eye contact.    Emergency Department Course   Emergency Department Course:  Nursing notes and vitals reviewed.  I performed an exam of the patient as documented above.  The above workup was undertaken.  I rechecked the patient and discussed results.  Findings and plan explained to the Patient. Patient discharged home, status improved, with instructions regarding supportive care, medications, and reasons to return as well as the importance of close follow-up was reviewed.    Impression & Plan    Medical Decision Making:  Jackeline Colin is a 21 year old female who presents for evaluation of ear pain and sinus pressure.  This is consistent with an upper respiratory tract infection and acute otitis media on the right side. There is no sign of mastoiditis, meningitis, perforation, mass, dental abscess, or peritonsillar abscess. There is no evidence of otitis externa. There is no signs at this point of serious bacterial infection such as retropharyngeal abscess, epiglottitis, peritonsillar abscess, strep pharyngitis, pneumonia, meningitis, bacteremia, serious bacterial infection. Given clear lungs, fever curve, no hypoxia and no respiratory distress I do not feel she needs a CXR at this point as the probability of bacterial pneumonia is very unlikely.  There are no gastrointestinal symptoms at this point and no signs of dehydration. The patient will be started on antibiotics and may take Tylenol or Ibuprofen  for pain.  Return instructions for ED care given. Regardless they should see primary care doctor for ear recheck in 1-2 weeks.  See primary physician in 3 days if symptoms not better or if new symptoms develop.    Diagnosis:    ICD-10-CM   1. URI with cough and congestion J06.9   2. Acute otitis media, unspecified laterality, unspecified otitis media type H66.90     Disposition:  Discharged to home with Amoxicillin.    Discharge Medications:  New Prescriptions    AMOXICILLIN (AMOXIL) 500 MG CAPSULE    Take 1 capsule (500 mg) by mouth 3 times daily for 7 days     Charlotte NAVARRO am serving as a scribe on 9/22/2017 at 10:19 PM to personally document services performed by Ghazal Zaman CNP, based on my observations and the provider's statements to me.     EMERGENCY DEPARTMENT       Ghazal Zaman CNP  09/23/17 0005

## 2017-11-10 ENCOUNTER — NURSE TRIAGE (OUTPATIENT)
Dept: NURSING | Facility: CLINIC | Age: 22
End: 2017-11-10

## 2017-11-10 ENCOUNTER — HOSPITAL ENCOUNTER (EMERGENCY)
Facility: CLINIC | Age: 22
Discharge: HOME | End: 2017-11-10
Attending: EMERGENCY MEDICINE | Admitting: EMERGENCY MEDICINE
Payer: COMMERCIAL

## 2017-11-10 VITALS — HEART RATE: 124 BPM | RESPIRATION RATE: 20 BRPM | TEMPERATURE: 97.9 F | OXYGEN SATURATION: 98 %

## 2017-11-10 DIAGNOSIS — J06.9 ACUTE URI: ICD-10-CM

## 2017-11-10 DIAGNOSIS — J45.901 ASTHMA WITH ACUTE EXACERBATION, UNSPECIFIED ASTHMA SEVERITY, UNSPECIFIED WHETHER PERSISTENT: ICD-10-CM

## 2017-11-10 PROCEDURE — 25000125 ZZHC RX 250: Performed by: EMERGENCY MEDICINE

## 2017-11-10 PROCEDURE — 25000125 ZZHC RX 250

## 2017-11-10 PROCEDURE — 99283 EMERGENCY DEPT VISIT LOW MDM: CPT | Mod: 25

## 2017-11-10 PROCEDURE — 40000275 ZZH STATISTIC RCP TIME EA 10 MIN

## 2017-11-10 PROCEDURE — 94640 AIRWAY INHALATION TREATMENT: CPT

## 2017-11-10 RX ORDER — INHALER, ASSIST DEVICES
1 SPACER (EA) MISCELLANEOUS ONCE
Status: DISCONTINUED | OUTPATIENT
Start: 2017-11-10 | End: 2017-11-10 | Stop reason: HOSPADM

## 2017-11-10 RX ORDER — IPRATROPIUM BROMIDE AND ALBUTEROL SULFATE 2.5; .5 MG/3ML; MG/3ML
6 SOLUTION RESPIRATORY (INHALATION) ONCE
Status: COMPLETED | OUTPATIENT
Start: 2017-11-10 | End: 2017-11-10

## 2017-11-10 RX ORDER — PREDNISONE 20 MG/1
40 TABLET ORAL ONCE
Status: COMPLETED | OUTPATIENT
Start: 2017-11-10 | End: 2017-11-10

## 2017-11-10 RX ORDER — PREDNISONE 20 MG/1
40 TABLET ORAL DAILY
Qty: 8 TABLET | Refills: 0 | Status: SHIPPED | OUTPATIENT
Start: 2017-11-10 | End: 2017-11-14

## 2017-11-10 RX ORDER — IPRATROPIUM BROMIDE AND ALBUTEROL SULFATE 2.5; .5 MG/3ML; MG/3ML
6 SOLUTION RESPIRATORY (INHALATION) ONCE
Status: DISCONTINUED | OUTPATIENT
Start: 2017-11-10 | End: 2017-11-10 | Stop reason: HOSPADM

## 2017-11-10 RX ORDER — ALBUTEROL SULFATE 0.83 MG/ML
2.5 SOLUTION RESPIRATORY (INHALATION) EVERY 6 HOURS PRN
Status: DISCONTINUED | OUTPATIENT
Start: 2017-11-10 | End: 2017-11-10 | Stop reason: HOSPADM

## 2017-11-10 RX ORDER — ALBUTEROL SULFATE 0.83 MG/ML
SOLUTION RESPIRATORY (INHALATION)
Status: COMPLETED
Start: 2017-11-10 | End: 2017-11-10

## 2017-11-10 RX ADMIN — IPRATROPIUM BROMIDE AND ALBUTEROL SULFATE 6 ML: .5; 3 SOLUTION RESPIRATORY (INHALATION) at 02:34

## 2017-11-10 RX ADMIN — ALBUTEROL SULFATE 2.5 MG: 2.5 SOLUTION RESPIRATORY (INHALATION) at 02:44

## 2017-11-10 RX ADMIN — PREDNISONE 40 MG: 20 TABLET ORAL at 02:55

## 2017-11-10 ASSESSMENT — ENCOUNTER SYMPTOMS
SHORTNESS OF BREATH: 1
COUGH: 1
FEVER: 0
WHEEZING: 1
VOMITING: 0
NAUSEA: 0

## 2017-11-10 NOTE — ED AVS SNAPSHOT
[facility] Emergency Department    [address] [place] [address]    Phone:  [phone]    Fax:  [phone]                                       Jackeline Colin   MRN: [id number]    Department:  [facility] Emergency Department   Date of Visit:  11/10/2017           Patient Information     Date Of Birth          1995        Your diagnoses for this visit were:     Asthma with acute exacerbation, unspecified asthma severity, unspecified whether persistent     Acute URI        You were seen by Esequiel Kilgore MD.      Follow-up Information     Follow up with Clinic, [facility]. Call in 1 week.    Contact information:    [address] [place]  [facility] [id number] [phone]          Discharge Instructions         Discharge Instructions  Asthma    Asthma is a condition causing narrowing and inflammation of the airways that can make it hard to breathe.  Asthma can also cause cough, wheezing, noisy breathing and tightness in the chest.  Asthma can be brought on or  triggered  by many things, including dust, mold, pollen, cigarette smoke, exercise, stress and infections, like the common cold.     Return to the Emergency Department if:    Your breathing gets worse.    You need to use your inhaler more often than every 4 hours, or can t get relief from your inhaler.    You are very weak, or feel much more ill.    You develop new symptoms, such as chest pain.    You cough up blood.    You are vomiting enough that you can t keep fluids or your medicine down.    What can I do to help myself?    Fill any prescriptions the doctor gave you and take them right away--especially antibiotics. Be sure to finish the whole antibiotic prescription.    You may be given a prescription for an inhaler, which can help loosen tight air passages.  Use this as needed, but not more often than directed. Inhalers work much better when used with a spacer.     You may be  given a prescription for a steroid to reduce inflammation. Used long-term, these can have many serious side effects, but for short courses these do not happen. You may notice restlessness or increased appetite.        You may use non-prescription cough or cold medicines. Cough medicines may help, but don t make the cough go away completely.     Avoid smoke, because this can make your symptoms worse. If you smoke, this may be a good time to quit! Consider using nicotine lozenges, gum, or patches to reduce cravings.     If you have a fever, Tylenol  (acetaminophen), Motrin  (ibuprofen), or Advil  (ibuprofen), may help bring fever down and may help you feel more comfortable. Be sure to read and follow the package directions, and ask your doctor if you have questions.    Be sure to get your flu shot each year.  The pneumonia shot can help prevent pneumonia.  It is important that you follow up with your regular doctor, to be sure that you are improving from this spell (an acute asthma exacerbation), and also to do what you can to keep from having trouble again. Sometimes you need long-term medicines to keep your asthma under control.   If you were given a prescription for medicine here today, be sure to read all of the information (including the package insert) that comes with your prescription.  This will include important information about the medicine, its side effects, and any warnings that you need to know about.  The pharmacist who fills the prescription can provide more information and answer questions you may have about the medicine.  If you have questions or concerns that the pharmacist cannot address, please call or return to the Emergency Department.   Opioid Medication Information    Pain medications are among the most commonly prescribed medicines, so we are including this information for all our patients. If you did not receive pain medication or get a prescription for pain medicine, you can ignore it.      You may have been given a prescription for an opioid (narcotic) pain medicine and/or have received a pain medicine while here in the Emergency Department. These medicines can make you drowsy or impaired. You must not drive, operate dangerous equipment, or engage in any other dangerous activities while taking these medications. If you drive while taking these medications, you could be arrested for DUI, or driving under the influence. Do not drink any alcohol while you are taking these medications.     Opioid pain medications can cause addiction. If you have a history of chemical dependency of any type, you are at a higher risk of becoming addicted to pain medications.  Only take these prescribed medications to treat your pain when all other options have been tried. Take it for as short a time and as few doses as possible. Store your pain pills in a secure place, as they are frequently stolen and provide a dangerous opportunity for children or visitors in your house to start abusing these powerful medications. We will not replace any lost or stolen medicine.  As soon as your pain is better, you should flush all your remaining medication.     Many prescription pain medications contain Tylenol  (acetaminophen), including Vicodin , Tylenol #3 , Norco , Lortab , and Percocet .  You should not take any extra pills of Tylenol  if you are using these prescription medications or you can get very sick.  Do not ever take more than 3000 mg of acetaminophen in any 24 hour period.    All opioids tend to cause constipation. Drink plenty of water and eat foods that have a lot of fiber, such as fruits, vegetables, prune juice, apple juice and high fiber cereal.  Take a laxative if you don t move your bowels at least every other day. Miralax , Milk of Magnesia, Colace , or Senna  can be used to keep you regular.      Remember that you can always come back to the Emergency Department if you are not able to see your regular doctor  in the amount of time listed above, if you get any new symptoms, or if there is anything that worries you.        24 Hour Appointment Hotline       To make an appointment at any [facility], call [phone] ([phone]). If you don't have a family doctor or clinic, we will help you find one. [facility] are conveniently located to serve the needs of you and your family.             Review of your medicines      START taking        Dose / Directions Last dose taken    predniSONE 20 MG tablet   Commonly known as:  DELTASONE   Dose:  40 mg   Quantity:  8 tablet        Take 2 tablets (40 mg) by mouth daily for 4 days   Refills:  0          Our records show that you are taking the medicines listed below. If these are incorrect, please call your family doctor or clinic.        Dose / Directions Last dose taken    albuterol 108 (90 BASE) MCG/ACT Inhaler   Commonly known as:  PROAIR HFA   Dose:  2 puff   Quantity:  1 Inhaler        Inhale 2 puffs into the lungs every 4 hours as needed for shortness of breath / dyspnea   Refills:  0        CLARITIN 10 MG capsule   Dose:  10 mg   Generic drug:  loratadine        Take 10 mg by mouth daily   Refills:  0        ZYRTEC ALLERGY PO        Refills:  0                Prescriptions were sent or printed at these locations (1 Prescription)                   Other Prescriptions                Printed at Department/Unit printer (1 of 1)         predniSONE (DELTASONE) 20 MG tablet                Orders Needing Specimen Collection     None      Pending Results     No orders found from 11/8/2017 to 11/11/2017.            Pending Culture Results     No orders found from 11/8/2017 to 11/11/2017.            Pending Results Instructions     If you had any lab results that were not finalized at the time of your Discharge, you can call the ED Lab Result RN at [phone]. You will be contacted by this team for any positive Lab results or changes in treatment. The nurses are  available 7 days a week from 10A to 6:30P.  You can leave a message 24 hours per day and they will return your call.        Test Results From Your Hospital Stay               Clinical Quality Measure: Blood Pressure Screening     Your blood pressure was checked while you were in the emergency department today. The last reading we obtained was    . Please read the guidelines below about what these numbers mean and what you should do about them.  If your systolic blood pressure (the top number) is less than 120 and your diastolic blood pressure (the bottom number) is less than 80, then your blood pressure is normal. There is nothing more that you need to do about it.  If your systolic blood pressure (the top number) is 120-139 or your diastolic blood pressure (the bottom number) is 80-89, your blood pressure may be higher than it should be. You should have your blood pressure rechecked within a year by a primary care provider.  If your systolic blood pressure (the top number) is 140 or greater or your diastolic blood pressure (the bottom number) is 90 or greater, you may have high blood pressure. High blood pressure is treatable, but if left untreated over time it can put you at risk for heart attack, stroke, or kidney failure. You should have your blood pressure rechecked by a primary care provider within the next 4 weeks.  If your provider in the emergency department today gave you specific instructions to follow-up with your doctor or provider even sooner than that, you should follow that instruction and not wait for up to 4 weeks for your follow-up visit.        Thank you for choosing [facility]       Thank you for choosing [facility] for your care. Our goal is always to provide you with excellent care. Hearing back from our patients is one way we can continue to improve our services. Please take a few minutes to complete the written survey that you may receive in the mail after you visit with us. Thank you!       "  MyChart Information     [employer] lets you send messages to your doctor, view your test results, renew your prescriptions, schedule appointments and more. To sign up, go to [email or web address] . Click on \"Log in\" on the left side of the screen, which will take you to the Welcome page. Then click on \"Sign up Now\" on the right side of the page.     You will be asked to enter the access code listed below, as well as some personal information. Please follow the directions to create your username and password.     Your access code is: [email or web address]  Expires: 2017 10:06 PM     Your access code will  in 90 days. If you need help or a new code, please call your [facility] clinic or [phone].        Care EveryWhere ID     This is your Care EveryWhere ID. This could be used by other organizations to access your [facility] medical records  [id number]        Equal Access to Services     KINGSLEY THOMAS : Hadii florentino valentinoo Sodominic, waaxda luqadaha, qaybta kaalmada adeeghudson, jemal rodriguez . So [facility] [phone].    ATENCIÓN: Si habla español, tiene a godinez disposición servicios gratuitos de asistencia lingüística. Llame al [phone].    We comply with applicable federal civil rights laws and [place] laws. We do not discriminate on the basis of race, color, national origin, age, disability, sex, sexual orientation, or gender identity.            After Visit Summary       This is your record. Keep this with you and show to your community pharmacist(s) and doctor(s) at your next visit.                  "

## 2017-11-10 NOTE — TELEPHONE ENCOUNTER
"Patient calling reporting she was discharged from [place] ED this morning and given tubing for an inhaler.  Stating she was advised a \"steroid inhaler\" would be called in. Patient reporting she was only given prednisone and no inhaler.  Stating she does not have an inhaler at home.   Placed call to [facility] ED who advised patient was given a steroid and had an Albuterol treatment.    Advised patient to contact her primary MD. Patient verbalized understanding.    Radha Linares RN  [facility]      "

## 2017-11-10 NOTE — ED AVS SNAPSHOT
[facility] Emergency Department    [address]    [facility] [address]    Phone:  [phone]    Fax:  [phone]                                       Jackeline Colin   MRN: [id number]    Department:  [facility] Emergency Department   Date of Visit:  11/10/2017           After Visit Summary Signature Page     I have received my discharge instructions, and my questions have been answered. I have discussed any challenges I see with this plan with the nurse or doctor.    ..........................................................................................................................................  Patient/Patient Representative Signature      ..........................................................................................................................................  Patient Representative Print Name and Relationship to Patient    ..................................................               ................................................  Date                                            Time    ..........................................................................................................................................  Reviewed by Signature/Title    ...................................................              ..............................................  Date                                                            Time

## 2017-11-10 NOTE — ED PROVIDER NOTES
History     Chief Complaint:  Shortness of breath     HPI   Jackeline Colin is a 22-year-old female with asthma who presents for evaluation of shortness of breath. The patient reports that she has been dealing with a cold for about a week and a half. She states that she normally gets asthma exacerbations when she has other illnesses and reports that she did not have her inhaler tonight as she was not at her home. The patient also states that she neds a refill on her inhaler. The patient has not had any fever.     Allergies:  No known drug allergies.     Medications:    Cetirizine   Loratadine   Albuterol     Past Medical History:    Asthma     Past Surgical History:    History reviewed. No pertinent past surgical history.     Family History:    Hypertension--Mother   Hyperlipidemia--Mother     Social History:  Marital Status: Single   Presents to the ED with friend   Tobacco Use: Never   Alcohol Use: Yes   [facility]     Review of Systems   Constitutional: Negative for fever.   Respiratory: Positive for cough, shortness of breath and wheezing.    Cardiovascular: Negative for chest pain.   Gastrointestinal: Negative for nausea and vomiting.   All other systems reviewed and are negative.    Physical Exam   First Vitals:  Pulse: 124  Heart Rate: 124  Temp: 97.9  F (36.6  C)  Resp: 20  SpO2: 99 %    Physical Exam  Constitutional:  Oriented to person, place, and time. In minor distress.   HENT:   Head:    Normocephalic.   Mouth/Throat:   Oropharynx is clear and moist.   Eyes:    EOM are normal. Pupils are equal, round, and reactive to light.   Neck:    Neck supple.   Cardiovascular:  Normal rate, regular rhythm and normal heart sounds.      Exam reveals no gallop and no friction rub.       No murmur heard.  Pulmonary/Chest:  After neb treatment: mild wheezing bilaterally. Normal aeration. Effort normal and breath sounds normal.      No respiratory distress. No wheezes. No rales.      No  reproducible chest wall pain.  Abdominal:   Soft. No distension. No tenderness. No rebound and no guarding.   Musculoskeletal:  Normal range of motion.   Neurological:   Alert and oriented to person, place, and time.           Moves all 4 extremities spontaneously    Skin:    No rash noted. No pallor.      Emergency Department Course   Interventions:  Medications   ipratropium - albuterol 0.5 mg/2.5 mg/3 mL (DUONEB) neb solution 6 mL    ipratropium - albuterol 0.5 mg/2.5 mg/3 mL (DUONEB) neb solution 6 mL (6 mLs Nebulization Given 11/10/17 0234)   albuterol (2.5 MG/3ML) 0.083% neb solution (2.5 mg  Given 11/10/17 0244)     Emergency Department Course:  Nursing notes and vitals reviewed.  I performed an exam of the patient as documented above.   The patient received the intervention(s) above.   Findings and plan explained to the patient. Patient discharged home with instructions regarding supportive care, medications, and reasons to return. The importance of close follow-up was reviewed. The patient was prescribed prednisone.      Impression & Plan    Medical Decision Making:  Jackeline Colin is a 22 year old female with a history of asthma who presents for evaluation of cough, shortness of breath, and wheezing.  Signs and symptoms are consistent with asthma exacerbation with a concurrent URI.  A broad differential was considered including foreign body, asthma, pneumonia, bronchitis, reactive airway disease, pneumothorax, cardiac equivalent, viral induced wheezing, allergic phenomena, etc.  She feels improved after interventions here in ED.  There are no signs at this point of any serious etiologies including those mentioned above.  No indication for hospitalization at this time including no hypoxia, no marked increase in respiratory rate, minimal to no retractions and no indication for chest x-ray as likelihood of pneumonia is low.   Supportive outpatient management is indicated, medications for discharge noted  above.  Close followup with primary care physician.  Return if increased wheezing, progressive shortness of breath, develops fever greater than 102.      Diagnosis:    ICD-10-CM    1. Asthma with acute exacerbation, unspecified asthma severity, unspecified whether persistent J45.901    2. Acute URI J06.9      Disposition:  discharged to home    Discharge Medications:  New Prescriptions    PREDNISONE (DELTASONE) 20 MG TABLET    Take 2 tablets (40 mg) by mouth daily for 4 days         [facility] EMERGENCY DEPARTMENT    Scribe disclosure:  Franki NAVARRO, am serving as a scribe on 11/10/2017 at 2:37 AM to personally document services performed by Dr. Kilgore based on my observations and the provider's statements to me./                 Esequiel Kilgore MD  11/10/17 0408

## 2018-04-17 ENCOUNTER — OFFICE VISIT (OUTPATIENT)
Dept: URGENT CARE | Facility: URGENT CARE | Age: 23
End: 2018-04-17
Payer: COMMERCIAL

## 2018-04-17 DIAGNOSIS — J45.20 MILD INTERMITTENT ASTHMA, UNSPECIFIED WHETHER COMPLICATED: Primary | ICD-10-CM

## 2018-04-17 PROCEDURE — 99213 OFFICE O/P EST LOW 20 MIN: CPT | Performed by: PHYSICIAN ASSISTANT

## 2018-04-17 RX ORDER — ALBUTEROL SULFATE 90 UG/1
2 AEROSOL, METERED RESPIRATORY (INHALATION) EVERY 6 HOURS
Qty: 1 INHALER | Refills: 3 | Status: SHIPPED | OUTPATIENT
Start: 2018-04-17

## 2018-04-17 NOTE — MR AVS SNAPSHOT
"              After Visit Summary   2018    Jackeline Colin    MRN: [id number]           Patient Information     Date Of Birth          1995        Visit Information        Provider Department      2018 4:10 PM Art De La Cruz PA-C [facility]        Today's Diagnoses     Mild intermittent asthma, unspecified whether complicated    -  1       Follow-ups after your visit        Who to contact     If you have questions or need follow up information about today's clinic visit or your schedule please contact [facility] directly at [phone].  Normal or non-critical lab and imaging results will be communicated to you by CoreFlowhart, letter or phone within 4 business days after the clinic has received the results. If you do not hear from us within 7 days, please contact the clinic through CoreFlowhart or phone. If you have a critical or abnormal lab result, we will notify you by phone as soon as possible.  Submit refill requests through [employer] or call your pharmacy and they will forward the refill request to us. Please allow 3 business days for your refill to be completed.          Additional Information About Your Visit        MyChart Information     NGI lets you send messages to your doctor, view your test results, renew your prescriptions, schedule appointments and more. To sign up, go to [email or web address] . Click on \"Log in\" on the left side of the screen, which will take you to the Welcome page. Then click on \"Sign up Now\" on the right side of the page.     You will be asked to enter the access code listed below, as well as some personal information. Please follow the directions to create your username and password.     Your access code is: [email or web address]  Expires: 2018 10:35 AM     Your access code will  in 90 days. If you need help or a new code, please call your [place] clinic or [phone].        Care EveryWhere ID     " This is your Care EveryWhere ID. This could be used by other organizations to access your [place] medical records  [id number]         Blood Pressure from Last 3 Encounters:   09/22/17 135/87   07/17/17 120/70   05/12/17 120/80    Weight from Last 3 Encounters:   07/17/17 156 lb (70.8 kg)   05/06/17 150 lb (68 kg)   12/12/16 140 lb (63.5 kg)              Today, you had the following     No orders found for display         Today's Medication Changes          These changes are accurate as of 4/17/18 11:59 PM.  If you have any questions, ask your nurse or doctor.               These medicines have changed or have updated prescriptions.        Dose/Directions    * albuterol 108 (90 Base) MCG/ACT Inhaler   Commonly known as:  PROAIR HFA   This may have changed:  Another medication with the same name was added. Make sure you understand how and when to take each.        Dose:  2 puff   Inhale 2 puffs into the lungs every 4 hours as needed for shortness of breath / dyspnea   Quantity:  1 Inhaler   Refills:  0       * albuterol 108 (90 Base) MCG/ACT Inhaler   Commonly known as:  PROVENTIL HFA   This may have changed:  You were already taking a medication with the same name, and this prescription was added. Make sure you understand how and when to take each.   Used for:  Mild intermittent asthma, unspecified whether complicated        Dose:  2 puff   Inhale 2 puffs into the lungs every 6 hours   Quantity:  1 Inhaler   Refills:  3       * Notice:  This list has 2 medication(s) that are the same as other medications prescribed for you. Read the directions carefully, and ask your doctor or other care provider to review them with you.         Where to get your medicines      These medications were sent to [facility]/pharmacy #[id number] - [place], [place] - [address] [address]     Phone:  [phone]     albuterol 108 (90 Base) MCG/ACT Inhaler                Primary Care Provider Office Phone # Fax #     [facility] [phone]       [address]  [facility] [address]        Equal Access to Services     KINGSLEY THOMAS : Hadii florentino ku hadberhaneo Soomaali, waaxda luqadaha, qaybta kaalmada adealfredo, jemal mcallisterorlin reza. So [facility] [phone].    ATENCIÓN: Si habla español, tiene a godinez disposición servicios gratuitos de asistencia lingüística. Llame al [phone].    We comply with applicable federal civil rights laws and [place] laws. We do not discriminate on the basis of race, color, national origin, age, disability, sex, sexual orientation, or gender identity.            Thank you!     Thank you for choosing [facility]  for your care. Our goal is always to provide you with excellent care. Hearing back from our patients is one way we can continue to improve our services. Please take a few minutes to complete the written survey that you may receive in the mail after your visit with us. Thank you!             Your Updated Medication List - Protect others around you: Learn how to safely use, store and throw away your medicines at www.disposemymeds.org.          This list is accurate as of 4/17/18 11:59 PM.  Always use your most recent med list.                   Brand Name Dispense Instructions for use Diagnosis    * albuterol 108 (90 Base) MCG/ACT Inhaler    PROAIR HFA    1 Inhaler    Inhale 2 puffs into the lungs every 4 hours as needed for shortness of breath / dyspnea        * albuterol 108 (90 Base) MCG/ACT Inhaler    PROVENTIL HFA    1 Inhaler    Inhale 2 puffs into the lungs every 6 hours    Mild intermittent asthma, unspecified whether complicated       CLARITIN 10 MG capsule   Generic drug:  loratadine      Take 10 mg by mouth daily        ZYRTEC ALLERGY PO           * Notice:  This list has 2 medication(s) that are the same as other medications prescribed for you. Read the directions carefully, and ask your doctor or  other care provider to review them with you.

## 2018-04-19 NOTE — PROGRESS NOTES
SUBJECTIVE:   Jackeline Colin is a 22 year old female presenting with a chief complaint of needing albuterol due to asthma.  Onset of symptoms was 1 week(s) ago.  Course of illness is same.    Severity mild  Current and Associated symptoms: wheezing  Treatment measures tried include Inhaler (name: albuterol).  Predisposing factors include asthma.    Past Medical History:   Diagnosis Date     Tonsil, abscess 6/6/2014    peritonsilar abscess     Uncomplicated asthma         Allergies   Allergen Reactions     Cats      Dust Mites      Nkda [No Known Drug Allergies]      Seasonal Allergies          Social History   Substance Use Topics     Smoking status: Never Smoker     Smokeless tobacco: Never Used     Alcohol use Yes       ROS:  CONSTITUTIONAL:NEGATIVE for fever, chills, change in weight  INTEGUMENTARY/SKIN: NEGATIVE for worrisome rashes, moles or lesions  ENT/MOUTH: NEGATIVE for ear, mouth and throat problems  RESP:POSITIVE for chest tightness  CV: NEGATIVE for chest pain, palpitations or peripheral edema  GI: NEGATIVE for nausea, abdominal pain, heartburn, or change in bowel habits  MUSCULOSKELETAL: NEGATIVE for significant arthralgias or myalgia  NEURO: NEGATIVE for weakness, dizziness or paresthesias    OBJECTIVE  :There were no vitals taken for this visit.  GENERAL APPEARANCE: healthy, alert and no distress  HENT: ear canals and TM's normal.  Nose and mouth without ulcers, erythema or lesions  NECK: supple, nontender, no lymphadenopathy  RESP: lungs clear to auscultation - no rales, rhonchi or wheezes  CV: regular rates and rhythm, normal S1 S2, no murmur noted  NEURO: Normal strength and tone, sensory exam grossly normal,  normal speech and mentation  SKIN: no suspicious lesions or rashes    ASSESSMENT/PLAN:      ICD-10-CM    1. Mild intermittent asthma, unspecified whether complicated J45.20 albuterol (PROVENTIL HFA) 108 (90 Base) MCG/ACT Inhaler       Follow up as needed  See orders in Epic

## 2018-06-19 ENCOUNTER — OFFICE VISIT (OUTPATIENT)
Dept: URGENT CARE | Facility: URGENT CARE | Age: 23
End: 2018-06-19
Payer: COMMERCIAL

## 2018-06-19 VITALS — DIASTOLIC BLOOD PRESSURE: 88 MMHG | RESPIRATION RATE: 10 BRPM | SYSTOLIC BLOOD PRESSURE: 118 MMHG | HEART RATE: 78 BPM

## 2018-06-19 DIAGNOSIS — J02.0 ACUTE STREPTOCOCCAL PHARYNGITIS: ICD-10-CM

## 2018-06-19 DIAGNOSIS — R07.0 THROAT PAIN: Primary | ICD-10-CM

## 2018-06-19 LAB
DEPRECATED S PYO AG THROAT QL EIA: ABNORMAL
SPECIMEN SOURCE: ABNORMAL

## 2018-06-19 PROCEDURE — 87880 STREP A ASSAY W/OPTIC: CPT | Performed by: FAMILY MEDICINE

## 2018-06-19 PROCEDURE — 99213 OFFICE O/P EST LOW 20 MIN: CPT | Performed by: FAMILY MEDICINE

## 2018-06-19 RX ORDER — PENICILLIN V POTASSIUM 500 MG/1
500 TABLET, FILM COATED ORAL 2 TIMES DAILY
Qty: 20 TABLET | Refills: 0 | Status: SHIPPED | OUTPATIENT
Start: 2018-06-19 | End: 2018-06-29

## 2018-06-19 NOTE — MR AVS SNAPSHOT
"              After Visit Summary   2018    Jackeline Colin    MRN: [id number]           Patient Information     Date Of Birth          1995        Visit Information        Provider Department      2018 9:50 AM Myla Obando MD [facility]        Today's Diagnoses     Throat pain    -  1    Acute streptococcal pharyngitis           Follow-ups after your visit        Who to contact     If you have questions or need follow up information about today's clinic visit or your schedule please contact [facility] directly at [phone].  Normal or non-critical lab and imaging results will be communicated to you by MyChart, letter or phone within 4 business days after the clinic has received the results. If you do not hear from us within 7 days, please contact the clinic through Headplayhart or phone. If you have a critical or abnormal lab result, we will notify you by phone as soon as possible.  Submit refill requests through [employer] or call your pharmacy and they will forward the refill request to us. Please allow 3 business days for your refill to be completed.          Additional Information About Your Visit        MyChart Information     [employer] lets you send messages to your doctor, view your test results, renew your prescriptions, schedule appointments and more. To sign up, go to [email or web address] . Click on \"Log in\" on the left side of the screen, which will take you to the Welcome page. Then click on \"Sign up Now\" on the right side of the page.     You will be asked to enter the access code listed below, as well as some personal information. Please follow the directions to create your username and password.     Your access code is: [email or web address]  Expires: 2018 10:35 AM     Your access code will  in 90 days. If you need help or a new code, please call your [place] clinic or [phone].        Care EveryWhere ID     This is " your Care EveryWhere ID. This could be used by other organizations to access your [place] medical records  [id number]        Your Vitals Were     Pulse Respirations                78 10           Blood Pressure from Last 3 Encounters:   06/19/18 118/88   09/22/17 135/87   07/17/17 120/70    Weight from Last 3 Encounters:   07/17/17 156 lb (70.8 kg)   05/06/17 150 lb (68 kg)   12/12/16 140 lb (63.5 kg)              We Performed the Following     Strep, Rapid Screen          Today's Medication Changes          These changes are accurate as of 6/19/18 10:27 AM.  If you have any questions, ask your nurse or doctor.               Start taking these medicines.        Dose/Directions    penicillin V potassium 500 MG tablet   Commonly known as:  VEETID   Used for:  Acute streptococcal pharyngitis   Started by:  Myla Obando MD        Dose:  500 mg   Take 1 tablet (500 mg) by mouth 2 times daily for 10 days   Quantity:  20 tablet   Refills:  0            Where to get your medicines      [facility] [address] [place] [address] [address] AT [address] [place] [address] & [address] [address]    Hours:  24-hours Phone:  [phone]     penicillin V potassium 500 MG tablet                Primary Care Provider Office Phone # Fax #    [facility] [phone] [address]  [facility] [address]        Equal Access to Services     KINGSLEY THOMAS AH: Hadii aad ku hadasho Soomaali, waaxda luqadaha, qaybta kaalmada adeegyada, waxjaneth margoth spence [facility] Center[facility]ath[facility] reza. So [facility] [phone].    ATENCIÓN: Si habla español, tiene a godinez disposición servicios gratuitos de asistencia lingüística. Llleonel al [phone].    We comply with applicable federal civil rights laws and [place] laws. We do not discriminate on the basis of race, color, national origin, age, disability, sex, sexual orientation, or gender identity.            Thank  you!     Thank you for choosing [facility]  for your care. Our goal is always to provide you with excellent care. Hearing back from our patients is one way we can continue to improve our services. Please take a few minutes to complete the written survey that you may receive in the mail after your visit with us. Thank you!             Your Updated Medication List - Protect others around you: Learn how to safely use, store and throw away your medicines at www.disposemymeds.org.          This list is accurate as of 6/19/18 10:27 AM.  Always use your most recent med list.                   Brand Name Dispense Instructions for use Diagnosis    * albuterol 108 (90 Base) MCG/ACT Inhaler    PROAIR HFA    1 Inhaler    Inhale 2 puffs into the lungs every 4 hours as needed for shortness of breath / dyspnea        * albuterol 108 (90 Base) MCG/ACT Inhaler    PROVENTIL HFA    1 Inhaler    Inhale 2 puffs into the lungs every 6 hours    Mild intermittent asthma, unspecified whether complicated       CLARITIN 10 MG capsule   Generic drug:  loratadine      Take 10 mg by mouth daily        penicillin V potassium 500 MG tablet    VEETID    20 tablet    Take 1 tablet (500 mg) by mouth 2 times daily for 10 days    Acute streptococcal pharyngitis       ZYRTEC ALLERGY PO           * Notice:  This list has 2 medication(s) that are the same as other medications prescribed for you. Read the directions carefully, and ask your doctor or other care provider to review them with you.

## 2018-06-19 NOTE — PROGRESS NOTES
SUBJECTIVE:  Jackeline Colin is a 22 year old female with a chief complaint of sore throat.  Onset of symptoms was 1 day(s) ago.    Course of illness: sudden onset and worsening.  Severity moderate  Current and Associated symptoms: chills  Treatment measures tried include None tried.  Predisposing factors include exposure to strep.    Past Medical History:   Diagnosis Date     Tonsil, abscess 6/6/2014    peritonsilar abscess     Uncomplicated asthma      Current Outpatient Prescriptions   Medication Sig Dispense Refill     albuterol (ALBUTEROL) 108 (90 BASE) MCG/ACT Inhaler Inhale 2 puffs into the lungs every 4 hours as needed for shortness of breath / dyspnea 1 Inhaler 0     albuterol (PROVENTIL HFA) 108 (90 Base) MCG/ACT Inhaler Inhale 2 puffs into the lungs every 6 hours 1 Inhaler 3     Cetirizine HCl (ZYRTEC ALLERGY PO)        loratadine (CLARITIN) 10 MG capsule Take 10 mg by mouth daily       Social History   Substance Use Topics     Smoking status: Never Smoker     Smokeless tobacco: Never Used     Alcohol use Yes       ROS:  Review of systems negative except as stated above.    OBJECTIVE:   /88 (BP Location: Left arm, Patient Position: Chair, Cuff Size: Adult Regular)  Pulse 78  Resp 10  GENERAL APPEARANCE: healthy, alert and no distress  EYES: EOMI,  PERRL, conjunctiva clear  HENT: ear canals and TM's normal.  Nose normal.  Pharynx erythematous with some exudate noted.  NECK: supple, non-tender to palpation, no adenopathy noted  RESP: lungs clear to auscultation - no rales, rhonchi or wheezes  CV: regular rates and rhythm, normal S1 S2, no murmur noted  ABDOMEN:  soft, nontender, no HSM or masses and bowel sounds normal  SKIN: no suspicious lesions or rashes    Rapid Strep test is positive    ASSESSMENT:   Throat pain   Jackeline was seen today for pharyngitis.    Diagnoses and all orders for this visit:    Throat pain  -     Strep, Rapid Screen    Acute streptococcal pharyngitis  -     penicillin  V potassium (VEETID) 500 MG tablet; Take 1 tablet (500 mg) by mouth 2 times daily for 10 days          PLAN:   See orders in epic.   Symptomatic treat with gargles, lozenges, and OTC analgesic as needed. Follow-up with primary clinic if not improving.  Advisement given that patient will be contagious for the next 24-48 hours after antibiotics initiated  Follow up if  symptoms fail to improve or worsens   Pt understood and agreed with plan

## 2018-09-26 ENCOUNTER — OFFICE VISIT (OUTPATIENT)
Dept: URGENT CARE | Facility: URGENT CARE | Age: 23
End: 2018-09-26
Payer: COMMERCIAL

## 2018-09-26 VITALS
HEART RATE: 110 BPM | BODY MASS INDEX: 30.21 KG/M2 | WEIGHT: 176 LBS | RESPIRATION RATE: 16 BRPM | TEMPERATURE: 98.8 F | SYSTOLIC BLOOD PRESSURE: 122 MMHG | DIASTOLIC BLOOD PRESSURE: 86 MMHG | OXYGEN SATURATION: 97 %

## 2018-09-26 DIAGNOSIS — J45.21 MILD INTERMITTENT ASTHMA WITH EXACERBATION: Primary | ICD-10-CM

## 2018-09-26 PROCEDURE — 99213 OFFICE O/P EST LOW 20 MIN: CPT | Performed by: PHYSICIAN ASSISTANT

## 2018-09-26 RX ORDER — PREDNISONE 10 MG/1
10 TABLET ORAL DAILY
Qty: 5 TABLET | Refills: 0 | Status: SHIPPED | OUTPATIENT
Start: 2018-09-26 | End: 2018-10-01

## 2018-09-26 RX ORDER — ALBUTEROL SULFATE 90 UG/1
2 AEROSOL, METERED RESPIRATORY (INHALATION) EVERY 6 HOURS PRN
Qty: 1 INHALER | Refills: 1 | Status: SHIPPED | OUTPATIENT
Start: 2018-09-26 | End: 2019-10-29

## 2018-09-26 RX ORDER — FLUTICASONE PROPIONATE 50 MCG
2 SPRAY, SUSPENSION (ML) NASAL
COMMUNITY
Start: 2016-07-22

## 2018-09-26 RX ORDER — FLUTICASONE PROPIONATE 110 UG/1
1 AEROSOL, METERED RESPIRATORY (INHALATION)
COMMUNITY
Start: 2016-09-28 | End: 2019-10-29

## 2018-09-26 NOTE — PROGRESS NOTES
SUBJECTIVE:   Jackeline Colin is a 22 year old female presenting with a chief complaint of   1) dry cough and wheezing for 3 days  2) runny nose and congestion.  Taking cetirizine.    Using albuterol nebulizer and flovent steroid inhaler.    Denies any fevers.      Her nebulizer tubing broke this morning.      Course of illness is worsening.    Severity moderate  Current and Associated symptoms: as above  Treatment measures tried include as above.  Predisposing factors include HX of asthma.    Past Medical History:   Diagnosis Date     Tonsil, abscess 6/6/2014    peritonsilar abscess     Uncomplicated asthma      Patient Active Problem List   Diagnosis     Tonsil, abscess     Peritonsillar abscess     Social History   Substance Use Topics     Smoking status: Never Smoker     Smokeless tobacco: Never Used     Alcohol use Yes       ROS:  CONSTITUTIONAL:NEGATIVE for fever, chills, change in weight  INTEGUMENTARY/SKIN: NEGATIVE for worrisome rashes, moles or lesions  EYES: NEGATIVE for vision changes or irritation  ENT/MOUTH: as per HPI  RESP:as per HPI  CV: NEGATIVE for chest pain, palpitations or peripheral edema  GI: NEGATIVE for nausea, abdominal pain, heartburn, or change in bowel habits  MUSCULOSKELETAL: NEGATIVE for significant arthralgias or myalgia  NEURO: NEGATIVE for weakness, dizziness or paresthesias  Review of systems negative except as stated above.    OBJECTIVE  :/86  Pulse 110  Temp 98.8  F (37.1  C) (Oral)  Resp 16  Wt 176 lb (79.8 kg)  SpO2 97%  BMI 30.21 kg/m2  GENERAL APPEARANCE: healthy, alert and no distress  EYES: EOMI,  PERRL, conjunctiva clear  HENT: ear canals and TM's normal.  Nose and mouth without ulcers, erythema or lesions  HENT: nasal turbinates boggy with bluish hue and rhinorrhea clear  NECK: supple, nontender, no lymphadenopathy  RESP: scattered wheezing  CV: regular rates and rhythm, normal S1 S2, no murmur noted  ABDOMEN:  soft, nontender, no HSM or masses and bowel  sounds normal  NEURO: Normal strength and tone, sensory exam grossly normal,  normal speech and mentation  SKIN: no suspicious lesions or rashes    (J45.21) Mild intermittent asthma with exacerbation  (primary encounter diagnosis)  Comment: with underlying allergies  Plan: predniSONE (DELTASONE) 10 MG tablet, albuterol         (PROAIR HFA/PROVENTIL HFA/VENTOLIN HFA) 108 (90        Base) MCG/ACT inhaler  Continue Flovent        Continue cetirizine and add flonase daily for 2 weeks.      Patient expresses understanding and agreement with the assessment and plan as above.      \F/U should symptoms persist or worsen.

## 2018-09-26 NOTE — MR AVS SNAPSHOT
"              After Visit Summary   2018    Jackeline Colin    MRN: [id number]           Patient Information     Date Of Birth          1995        Visit Information        Provider Department      2018 5:00 PM Amanda Burleson PA-C [facility]        Today's Diagnoses     Mild intermittent asthma with exacerbation    -  1       Follow-ups after your visit        Who to contact     If you have questions or need follow up information about today's clinic visit or your schedule please contact [facility] directly at [phone].  Normal or non-critical lab and imaging results will be communicated to you by [employer], letter or phone within 4 business days after the clinic has received the results. If you do not hear from us within 7 days, please contact the clinic through [employer] or phone. If you have a critical or abnormal lab result, we will notify you by phone as soon as possible.  Submit refill requests through [employer] or call your pharmacy and they will forward the refill request to us. Please allow 3 business days for your refill to be completed.          Additional Information About Your Visit        MyChart Information     [employer] lets you send messages to your doctor, view your test results, renew your prescriptions, schedule appointments and more. To sign up, go to [email or web address] . Click on \"Log in\" on the left side of the screen, which will take you to the Welcome page. Then click on \"Sign up Now\" on the right side of the page.     You will be asked to enter the access code listed below, as well as some personal information. Please follow the directions to create your username and password.     Your access code is: [email or web address]  Expires: 2018  7:34 PM     Your access code will  in 90 days. If you need help or a new code, please call your [facility] clinic or [phone].        Care EveryWhere ID     This " is your Care EveryWhere ID. This could be used by other organizations to access your [place] medical records  [id number]        Your Vitals Were     Pulse Temperature Respirations Pulse Oximetry BMI (Body Mass Index)       110 98.8  F (37.1  C) (Oral) 16 97% 30.21 kg/m2        Blood Pressure from Last 3 Encounters:   09/26/18 122/86   06/19/18 118/88   09/22/17 135/87    Weight from Last 3 Encounters:   09/26/18 176 lb (79.8 kg)   07/17/17 156 lb (70.8 kg)   05/06/17 150 lb (68 kg)              Today, you had the following     No orders found for display         Today's Medication Changes          These changes are accurate as of 9/26/18  7:34 PM.  If you have any questions, ask your nurse or doctor.               Start taking these medicines.        Dose/Directions    predniSONE 10 MG tablet   Commonly known as:  DELTASONE   Used for:  Mild intermittent asthma with exacerbation   Started by:  Amanda Burleson PA-C        Dose:  10 mg   Take 1 tablet (10 mg) by mouth daily for 5 days   Quantity:  5 tablet   Refills:  0         These medicines have changed or have updated prescriptions.        Dose/Directions    * albuterol 108 (90 Base) MCG/ACT inhaler   Commonly known as:  PROAIR HFA   This may have changed:  Another medication with the same name was added. Make sure you understand how and when to take each.   Changed by:  Amanda Burleson PA-C        Dose:  2 puff   Inhale 2 puffs into the lungs every 4 hours as needed for shortness of breath / dyspnea   Quantity:  1 Inhaler   Refills:  0       * albuterol 108 (90 Base) MCG/ACT inhaler   Commonly known as:  PROVENTIL HFA   This may have changed:  Another medication with the same name was added. Make sure you understand how and when to take each.   Used for:  Mild intermittent asthma, unspecified whether complicated   Changed by:  Amanda Burleson PA-C        Dose:  2 puff   Inhale 2 puffs into the lungs every 6 hours   Quantity:  1  Inhaler   Refills:  3       * albuterol 108 (90 Base) MCG/ACT inhaler   Commonly known as:  PROAIR HFA/PROVENTIL HFA/VENTOLIN HFA   This may have changed:  You were already taking a medication with the same name, and this prescription was added. Make sure you understand how and when to take each.   Used for:  Mild intermittent asthma with exacerbation   Changed by:  Amanda Burleson PA-C        Dose:  2 puff   Inhale 2 puffs into the lungs every 6 hours as needed for shortness of breath / dyspnea or wheezing   Quantity:  1 Inhaler   Refills:  1       * Notice:  This list has 3 medication(s) that are the same as other medications prescribed for you. Read the directions carefully, and ask your doctor or other care provider to review them with you.         Where to get your medicines      These medications were sent to [facility] [address] [place] [address] & [address] [address]    Hours:  24-hours Phone:  [phone]     albuterol 108 (90 Base) MCG/ACT inhaler    predniSONE 10 MG tablet                Primary Care Provider Office Phone # Fax #    [facility] [phone]       [address]  [facility] [address]        Equal Access to Services     KINGSLEY THOMAS AH: Hadii florentino ku hadasho Soomaali, waaxda luqadaha, qaybta kaalmada adeegyada, waxay idiin hayaan etta rodriguez . So [facility] [phone].    ATENCIÓN: Si habla español, tiene a godinez disposición servicios gratuitos de asistencia lingüística. Llame al [phone].    We comply with applicable federal civil rights laws and [place] laws. We do not discriminate on the basis of race, color, national origin, age, disability, sex, sexual orientation, or gender identity.            Thank you!     Thank you for choosing [facility]  for your care. Our goal is always to provide you with excellent care. Hearing back from our  patients is one way we can continue to improve our services. Please take a few minutes to complete the written survey that you may receive in the mail after your visit with us. Thank you!             Your Updated Medication List - Protect others around you: Learn how to safely use, store and throw away your medicines at www.disposemymeds.org.          This list is accurate as of 9/26/18  7:34 PM.  Always use your most recent med list.                   Brand Name Dispense Instructions for use Diagnosis    * albuterol 108 (90 Base) MCG/ACT inhaler    PROAIR HFA    1 Inhaler    Inhale 2 puffs into the lungs every 4 hours as needed for shortness of breath / dyspnea        * albuterol 108 (90 Base) MCG/ACT inhaler    PROVENTIL HFA    1 Inhaler    Inhale 2 puffs into the lungs every 6 hours    Mild intermittent asthma, unspecified whether complicated       * albuterol 108 (90 Base) MCG/ACT inhaler    PROAIR HFA/PROVENTIL HFA/VENTOLIN HFA    1 Inhaler    Inhale 2 puffs into the lungs every 6 hours as needed for shortness of breath / dyspnea or wheezing    Mild intermittent asthma with exacerbation       CLARITIN 10 MG capsule   Generic drug:  loratadine      Take 10 mg by mouth daily        fluticasone 110 MCG/ACT Inhaler    FLOVENT HFA     Inhale 1 puff into the lungs        fluticasone 50 MCG/ACT spray    FLONASE     2 sprays        predniSONE 10 MG tablet    DELTASONE    5 tablet    Take 1 tablet (10 mg) by mouth daily for 5 days    Mild intermittent asthma with exacerbation       ZYRTEC ALLERGY PO           * Notice:  This list has 3 medication(s) that are the same as other medications prescribed for you. Read the directions carefully, and ask your doctor or other care provider to review them with you.

## 2018-11-03 ENCOUNTER — HOSPITAL ENCOUNTER (EMERGENCY)
Facility: CLINIC | Age: 23
Discharge: HOME | End: 2018-11-03
Attending: EMERGENCY MEDICINE | Admitting: EMERGENCY MEDICINE
Payer: COMMERCIAL

## 2018-11-03 VITALS
TEMPERATURE: 97.7 F | HEIGHT: 64 IN | DIASTOLIC BLOOD PRESSURE: 76 MMHG | SYSTOLIC BLOOD PRESSURE: 128 MMHG | WEIGHT: 164 LBS | RESPIRATION RATE: 16 BRPM | BODY MASS INDEX: 28 KG/M2 | OXYGEN SATURATION: 97 %

## 2018-11-03 DIAGNOSIS — N76.0 BACTERIAL VAGINOSIS: ICD-10-CM

## 2018-11-03 DIAGNOSIS — B96.89 BACTERIAL VAGINOSIS: ICD-10-CM

## 2018-11-03 DIAGNOSIS — B00.9 HERPES SIMPLEX VIRUS INFECTION: ICD-10-CM

## 2018-11-03 LAB
ALBUMIN UR-MCNC: NEGATIVE MG/DL
APPEARANCE UR: ABNORMAL
BACTERIA #/AREA URNS HPF: ABNORMAL /HPF
BILIRUB UR QL STRIP: NEGATIVE
COLOR UR AUTO: ABNORMAL
GLUCOSE UR STRIP-MCNC: NEGATIVE MG/DL
HCG UR QL: NEGATIVE
HGB UR QL STRIP: ABNORMAL
KETONES UR STRIP-MCNC: NEGATIVE MG/DL
LEUKOCYTE ESTERASE UR QL STRIP: ABNORMAL
MUCOUS THREADS #/AREA URNS LPF: PRESENT /LPF
NITRATE UR QL: NEGATIVE
PH UR STRIP: 5.5 PH (ref 5–7)
RBC #/AREA URNS AUTO: 1 /HPF (ref 0–2)
SOURCE: ABNORMAL
SP GR UR STRIP: 1 (ref 1–1.03)
SPECIMEN SOURCE: ABNORMAL
SQUAMOUS #/AREA URNS AUTO: 1 /HPF (ref 0–1)
UROBILINOGEN UR STRIP-MCNC: NORMAL MG/DL (ref 0–2)
WBC #/AREA URNS AUTO: 1 /HPF (ref 0–5)
WET PREP SPEC: ABNORMAL

## 2018-11-03 PROCEDURE — 99283 EMERGENCY DEPT VISIT LOW MDM: CPT

## 2018-11-03 PROCEDURE — 87491 CHLMYD TRACH DNA AMP PROBE: CPT | Performed by: EMERGENCY MEDICINE

## 2018-11-03 PROCEDURE — 87529 HSV DNA AMP PROBE: CPT | Mod: 91 | Performed by: EMERGENCY MEDICINE

## 2018-11-03 PROCEDURE — 87529 HSV DNA AMP PROBE: CPT | Performed by: EMERGENCY MEDICINE

## 2018-11-03 PROCEDURE — 87210 SMEAR WET MOUNT SALINE/INK: CPT | Performed by: EMERGENCY MEDICINE

## 2018-11-03 PROCEDURE — 81001 URINALYSIS AUTO W/SCOPE: CPT | Performed by: EMERGENCY MEDICINE

## 2018-11-03 PROCEDURE — 81025 URINE PREGNANCY TEST: CPT | Performed by: EMERGENCY MEDICINE

## 2018-11-03 PROCEDURE — 87591 N.GONORRHOEAE DNA AMP PROB: CPT | Performed by: EMERGENCY MEDICINE

## 2018-11-03 RX ORDER — VALACYCLOVIR HYDROCHLORIDE 1 G/1
1000 TABLET, FILM COATED ORAL 2 TIMES DAILY
Qty: 20 TABLET | Refills: 0 | Status: SHIPPED | OUTPATIENT
Start: 2018-11-03 | End: 2019-10-29

## 2018-11-03 RX ORDER — METRONIDAZOLE 500 MG/1
500 TABLET ORAL 2 TIMES DAILY
Qty: 14 TABLET | Refills: 0 | Status: SHIPPED | OUTPATIENT
Start: 2018-11-03 | End: 2018-11-10

## 2018-11-03 ASSESSMENT — ENCOUNTER SYMPTOMS
FEVER: 0
DYSURIA: 1

## 2018-11-03 NOTE — ED AVS SNAPSHOT
Emergency Department    [address] [facility] [address]    Phone:  [phone]    Fax:  [phone]                                       Jackeline Colin   MRN: [id number]    Department:   Emergency Department   Date of Visit:  11/3/2018           After Visit Summary Signature Page     I have received my discharge instructions, and my questions have been answered. I have discussed any challenges I see with this plan with the nurse or doctor.    ..........................................................................................................................................  Patient/Patient Representative Signature      ..........................................................................................................................................  Patient Representative Print Name and Relationship to Patient    ..................................................               ................................................  Date                                   Time    ..........................................................................................................................................  Reviewed by Signature/Title    ...................................................              ..............................................  Date                                               Time          22EPIC Rev 08/18

## 2018-11-03 NOTE — ED PROVIDER NOTES
"  History     Chief Complaint:  Exposure to STD    HPI   Jackeline Colin is a 23 year old female with recent UTI and strep pharyngitis who presents to the emergency department today for evaluation of possible exposure to an STD. The patient reports she recently ended a course of antibiotics for a UTI and strep throat that ended three days ago. Her boyfriend who is a new partner was recently tested for STDs this past Saturday and was negative. They have had unprotected sex since his testing. She states that she has genital sores, dysuria, and mild bleeding from the sores for the past week. She is also concerned about the possibility of pregnancy. She was overwhelmed with the persistence of her symptoms prompting her visit the emergency department. At arrival, the patient tearful and very concerned about chance of STD. She denies fever.     Allergies:  Cats  Dust Mites  Seasonal Allergies    Medications:    Albuterol  Zyrtec  Flonase  Flovent  Claritin    Past Medical History:    Peritonsillar abscess    Past Surgical History:    History reviewed. No pertinent past surgical history.    Family History:    Hypertension  Hyperlipidemia    Social History:  The patient was accompanied to the ED by significant other.  Smoking Status: Never  Smokeless Tobacco: Never  Alcohol Use: Yes  Marital Status:  Single     Review of Systems   Constitutional: Negative for fever.   Genitourinary: Positive for dysuria and genital sores.   All other systems reviewed and are negative.    Physical Exam     Patient Vitals for the past 24 hrs:   BP Temp Temp src Heart Rate Resp SpO2 Height Weight   11/03/18 0209 133/80 97.7  F (36.5  C) Oral 134 16 94 % 1.626 m (5' 4\") 74.4 kg (164 lb)         Physical Exam  General: Appears well-developed and well-nourished.   Head: No signs of trauma. y  CV: Normal rate and regular rhythm.    Resp: Effort normal and breath sounds normal. No respiratory distress.   GI: Soft. There is no tenderness.  No " rebound or guarding.  Normal bowel sounds.    :  Small clusters of small vesicles to bilateral labia.  Minor vaginal discharge.  No other lesions.  No CMT  MSK: Normal range of motion.   Neuro: The patient is alert and oriented.  Speech normal.  GCS 15  Skin: Skin is warm and dry. No rash noted.   Psych: Tearful and anxious      Emergency Department Course   Laboratory:  Laboratory findings were communicated with the patient who voiced understanding of the findings.  UA: slightly cloudy, straw urine, blood trace, leukocyte esterase moderate, bacteria few, mucous present o/w WNL  Wet Prep: clue cells seen o/w WNL  HCG Qualitative Urine: Negative    Neisseria Gonorrhea PCR: Pending  Chlamydia Trachomatis PCR: Pending  HSV 1 and 1 DNA by PCR: Pending     Emergency Department Course:  Nursing notes and vitals reviewed.  0217: I performed an exam of the patient as documented above.   0220: I performed a pelvic exam with female chaperone as noted above.   0322: Patient rechecked and updated.   0333: I performed a pelvic exam with female chaperone as noted above.   Findings and plan explained to the Patient and significant other. Patient discharged home with instructions regarding supportive care, medications, and reasons to return. The importance of close follow-up was reviewed. The patient was prescribed Flagyl and Valtrex.   I personally reviewed the laboratory results with the Patient and significant other and answered all related questions prior to discharge.    Impression & Plan    Medical Decision Making:  Jackeline Colin is a 23-year-old woman who presents due to painful lesions and pain with urination.  She reports that she had completed treatment for urinary tract infection, but the symptoms continued and she is concerned for an STD.  On my evaluation, she did have a few areas of lesions to the labia concerning for herpes infection.  She had some mild discharge but her exam is otherwise benign.  Wet prep was  obtained that did show bacterial vaginosis so I did give a prescription for metronidazole.  I also did a herpes PCR screen at the patient's request and this is pending along with chlamydia and gonorrhea testing.  I did discuss my concerns for herpes virus with the patient and did recommend that we initiate antiviral therapy.  She is recommended follow up with her primary care doctor for further STD testing and treatment and instructed return for any further concerns.  I did discuss that the STD testing that was completed will take a few days to return and the patient could be called with any positive results.    Diagnosis:    ICD-10-CM    1. Herpes simplex virus infection B00.9    2. Bacterial vaginosis N76.0     B96.89        Disposition:  discharged to home    Discharge Medications:  New Prescriptions    METRONIDAZOLE (FLAGYL) 500 MG TABLET    Take 1 tablet (500 mg) by mouth 2 times daily for 7 days    VALACYCLOVIR (VALTREX) 1000 MG TABLET    Take 1 tablet (1,000 mg) by mouth 2 times daily       Scribe Disclosure:  Alexis NAVARRO, am serving as a scribe at 2:17 AM on 11/3/2018 to document services personally performed by Franklyn Garcia MD based on my observations and the provider's statements to me.     11/3/2018    EMERGENCY DEPARTMENT       Franklyn Garcia MD  11/03/18 2886

## 2018-11-03 NOTE — DISCHARGE INSTRUCTIONS
Herpes  If you have herpes, you re not alone. Millions of Americans have it. Herpes has no cure. But you can control it and learn how to protect yourself and others from outbreaks.  What is herpes?  Herpes is a chronic (lifelong) virus. It can cause sores and discomfort. You get it from contact with someone who carries the virus. If sores occur on the lips, you have oral herpes. If sores occur on the penis or around the vagina, you have genital herpes.  Herpes outbreaks    The first outbreak of herpes sores is usually the most severe. Then, the soldiers of the body s immune system, white blood cells, produce antibodies. These antibodies help neutralize the herpes virus and may help make future attacks less severe.    Some people have only one outbreak of sores. Some people have periods of frequent outbreaks (every few weeks). Outbreaks of herpes sores usually happen less often over time.    Herpes sores may appear without a cause. Outbreaks are more likely when the immune system is weak. Other viral infections (such as a cold) can cause outbreaks. Stress from a poor diet, fatigue, or emotional upset can lead to outbreaks of sores. Exposure to strong sunlight often causes herpes sores to reappear.   To help prevent outbreaks    To prevent oral herpes outbreaks, avoid overexposure to wind, sun, and extreme temperatures. Use sunscreen and lip balm on affected areas.    If you are having frequent outbreaks, ask your healthcare provider about medicines that can help prevent outbreaks.  How herpes spreads to others  Herpes can be spread during an outbreak. But even without sores present, you can still  shed  the virus and infect others. You can take steps to prevent this.  To protect yourself and others    If you have an oral sore, avoid kissing and oral-genital contact.    If you have a genital sore, avoid intercourse. Also avoid oral-genital contact.    Wash your hands after touching a sore.    Use a condom each time  you have sex. You can pass the virus even when sores aren t present. If you re unsure about the timing of certain kinds of physical contact, ask your health care provider.    Tell any new partners that you have herpes.    If you re a woman, have Pap tests as often as your healthcare provider recommends.    A woman can spread herpes to their  during the birth process, whether or not they have an active genital sore. If pregnant, don't forget to tell your healthcare provider early in the pregnancy.     In some cases, daily antiviral medicine (acyclovir, famcyclovir, or valavyclovir), in addition to consistent condom use, may reduce your chances of spreading herpes to an uninfected partner. Ask your healthcare provider if this medicine would be helpful for you.  Resources  American Social Health Association STD Hotline  321.465.1834  www.ashastd.org  Centers for Disease Control and Prevention  523.555.1475  www.cdc.gov/std   Date Last Reviewed: 2016-2017 [employer]. [address] [place] [address]. All rights reserved. This information is not intended as a substitute for professional medical care. Always follow your healthcare professional's instructions.          Bacterial Vaginosis    You have a vaginal infection called bacterial vaginosis (BV). Both good and bad bacteria are present in a healthy vagina. BV occurs when these bacteria get out of balance. The number of bad bacteria increase. And the number of good bacteria decrease. Although BV is associated with sexual activity, it is not a sexually transmitted disease.  BV may or may not cause symptoms. If symptoms do occur, they can include:    Thin, gray, milky-white, or sometimes green discharge    Unpleasant odor or  fishy  smell    Itching, burning, or pain in or around the vagina  It is not known what causes BV, but certain factors can make the problem more likely. This can include:    Douching    Having sex with a  new partner    Having sex with more than one partner  BV will sometimes go away on its own. But treatment is usually recommended. This is because untreated BV can increase the risk of more serious health problems such as:    Pelvic inflammatory disease (PID)     delivery (giving birth to a baby early if you re pregnant)    HIV and certain other sexually transmitted diseases (STDs)    Infection after surgery on the reproductive organs  Home care  General care    BV is most often treated with medicines called antibiotics. These may be given as pills or as a vaginal cream. If antibiotics are prescribed, be sure to use them exactly as directed. Also, be sure to complete all of the medicine, even if your symptoms go away.    Don't douche or having sex during treatment.    If you have sex with a female partner, ask your healthcare provider if she should also be treated.  Prevention    Don't douche.    Don't have sex. If you do have sex, then take steps to lower your risk:  ? Use condoms when having sex.  ? Limit the number of sexual partners you have.  Follow-up care  Follow up with your healthcare provider, or as advised.  When to seek medical advice  Call your healthcare provider right away if:    You have a fever of 100.4 F (38 C) or higher, or as directed by your provider.    Your symptoms worsen, or they don t go away within a few days of starting treatment.    You have new pain in the lower belly or pelvic region.    You have side effects that bother you or a reaction to the pills or cream you re prescribed.    You or any partners you have sex with have new symptoms, such as a rash, joint pain, or sores.  Date Last Reviewed: 10/1/2017    7041-0474 [employer]. [address] [place] [address], [place] [address]. All rights reserved. This information is not intended as a substitute for professional medical care. Always follow your healthcare professional's instructions.

## 2018-11-03 NOTE — ED NOTES
Assumed care at this time.  Leah Ha RN,.......................................... 11/3/2018   3:05 AM

## 2018-11-03 NOTE — ED AVS SNAPSHOT
Emergency Department    [address] [facility] [address]    Phone:  [phone]    Fax:  [phone]                                       Jackeline Colin   MRN: [id number]    Department:   Emergency Department   Date of Visit:  11/3/2018           Patient Information     Date Of Birth          1995        Your diagnoses for this visit were:     Herpes simplex virus infection     Bacterial vaginosis        You were seen by Franklyn Garcia MD.      Follow-up Information     [facility].    Contact information:    [address]  [facility] [id number] [phone]          Follow up with  Emergency Department.    Specialty:  EMERGENCY MEDICINE    Why:  As needed, If symptoms worsen    Contact information:    [address] [facility] [address] [phone]        Discharge Instructions         Herpes  If you have herpes, you re not alone. Millions of Americans have it. Herpes has no cure. But you can control it and learn how to protect yourself and others from outbreaks.  What is herpes?  Herpes is a chronic (lifelong) virus. It can cause sores and discomfort. You get it from contact with someone who carries the virus. If sores occur on the lips, you have oral herpes. If sores occur on the penis or around the vagina, you have genital herpes.  Herpes outbreaks    The first outbreak of herpes sores is usually the most severe. Then, the soldiers of the body s immune system, white blood cells, produce antibodies. These antibodies help neutralize the herpes virus and may help make future attacks less severe.    Some people have only one outbreak of sores. Some people have periods of frequent outbreaks (every few weeks). Outbreaks of herpes sores usually happen less often over time.    Herpes sores may appear without a cause. Outbreaks are more likely when the immune system is weak. Other viral infections (such as a cold) can cause  outbreaks. Stress from a poor diet, fatigue, or emotional upset can lead to outbreaks of sores. Exposure to strong sunlight often causes herpes sores to reappear.   To help prevent outbreaks    To prevent oral herpes outbreaks, avoid overexposure to wind, sun, and extreme temperatures. Use sunscreen and lip balm on affected areas.    If you are having frequent outbreaks, ask your healthcare provider about medicines that can help prevent outbreaks.  How herpes spreads to others  Herpes can be spread during an outbreak. But even without sores present, you can still  shed  the virus and infect others. You can take steps to prevent this.  To protect yourself and others    If you have an oral sore, avoid kissing and oral-genital contact.    If you have a genital sore, avoid intercourse. Also avoid oral-genital contact.    Wash your hands after touching a sore.    Use a condom each time you have sex. You can pass the virus even when sores aren t present. If you re unsure about the timing of certain kinds of physical contact, ask your health care provider.    Tell any new partners that you have herpes.    If you re a woman, have Pap tests as often as your healthcare provider recommends.    A woman can spread herpes to their  during the birth process, whether or not they have an active genital sore. If pregnant, don't forget to tell your healthcare provider early in the pregnancy.     In some cases, daily antiviral medicine (acyclovir, famcyclovir, or valavyclovir), in addition to consistent condom use, may reduce your chances of spreading herpes to an uninfected partner. Ask your healthcare provider if this medicine would be helpful for you.  Resources  American [employer] Association STD Hotline  834.818.3187  www.ashastd.org  Centers for Disease Control and Prevention  476.574.9337  www.cdc.gov/std   Date Last Reviewed: 2016-2017 [employer]. [address] [place] [address], [place] [address].  All rights reserved. This information is not intended as a substitute for professional medical care. Always follow your healthcare professional's instructions.          Bacterial Vaginosis    You have a vaginal infection called bacterial vaginosis (BV). Both good and bad bacteria are present in a healthy vagina. BV occurs when these bacteria get out of balance. The number of bad bacteria increase. And the number of good bacteria decrease. Although BV is associated with sexual activity, it is not a sexually transmitted disease.  BV may or may not cause symptoms. If symptoms do occur, they can include:    Thin, gray, milky-white, or sometimes green discharge    Unpleasant odor or  fishy  smell    Itching, burning, or pain in or around the vagina  It is not known what causes BV, but certain factors can make the problem more likely. This can include:    Douching    Having sex with a new partner    Having sex with more than one partner  BV will sometimes go away on its own. But treatment is usually recommended. This is because untreated BV can increase the risk of more serious health problems such as:    Pelvic inflammatory disease (PID)     delivery (giving birth to a baby early if you re pregnant)    HIV and certain other sexually transmitted diseases (STDs)    Infection after surgery on the reproductive organs  Home care  General care    BV is most often treated with medicines called antibiotics. These may be given as pills or as a vaginal cream. If antibiotics are prescribed, be sure to use them exactly as directed. Also, be sure to complete all of the medicine, even if your symptoms go away.    Don't douche or having sex during treatment.    If you have sex with a female partner, ask your healthcare provider if she should also be treated.  Prevention    Don't douche.    Don't have sex. If you do have sex, then take steps to lower your risk:  ? Use condoms when having sex.  ? Limit the number of sexual  partners you have.  Follow-up care  Follow up with your healthcare provider, or as advised.  When to seek medical advice  Call your healthcare provider right away if:    You have a fever of 100.4 F (38 C) or higher, or as directed by your provider.    Your symptoms worsen, or they don t go away within a few days of starting treatment.    You have new pain in the lower belly or pelvic region.    You have side effects that bother you or a reaction to the pills or cream you re prescribed.    You or any partners you have sex with have new symptoms, such as a rash, joint pain, or sores.  Date Last Reviewed: 10/1/2017    7105-0355 [employer]. [address] [place] [address], [place] [address]. All rights reserved. This information is not intended as a substitute for professional medical care. Always follow your healthcare professional's instructions.            24 Hour Appointment Hotline       To make an appointment at any [facility], call [phone] ([phone]). If you don't have a family doctor or clinic, we will help you find one. [facility] clinics are conveniently located to serve the needs of you and your family.             Review of your medicines      START taking        Dose / Directions Last dose taken    metroNIDAZOLE 500 MG tablet   Commonly known as:  FLAGYL   Dose:  500 mg   Quantity:  14 tablet        Take 1 tablet (500 mg) by mouth 2 times daily for 7 days   Refills:  0        valACYclovir 1000 mg tablet   Commonly known as:  VALTREX   Dose:  1000 mg   Quantity:  20 tablet        Take 1 tablet (1,000 mg) by mouth 2 times daily   Refills:  0          Our records show that you are taking the medicines listed below. If these are incorrect, please call your family doctor or clinic.        Dose / Directions Last dose taken    * albuterol 108 (90 Base) MCG/ACT inhaler   Commonly known as:  PROAIR HFA   Dose:  2 puff   Quantity:  1 Inhaler        Inhale 2 puffs into the lungs every 4 hours as  needed for shortness of breath / dyspnea   Refills:  0        * albuterol 108 (90 Base) MCG/ACT inhaler   Commonly known as:  PROVENTIL HFA   Dose:  2 puff   Quantity:  1 Inhaler        Inhale 2 puffs into the lungs every 6 hours   Refills:  3        * albuterol 108 (90 Base) MCG/ACT inhaler   Commonly known as:  PROAIR HFA/PROVENTIL HFA/VENTOLIN HFA   Dose:  2 puff   Quantity:  1 Inhaler        Inhale 2 puffs into the lungs every 6 hours as needed for shortness of breath / dyspnea or wheezing   Refills:  1        CLARITIN 10 MG capsule   Dose:  10 mg   Generic drug:  loratadine        Take 10 mg by mouth daily   Refills:  0        fluticasone 110 MCG/ACT Inhaler   Commonly known as:  FLOVENT HFA   Dose:  1 puff        Inhale 1 puff into the lungs   Refills:  0        fluticasone 50 MCG/ACT spray   Commonly known as:  FLONASE   Dose:  2 spray        2 sprays   Refills:  0        ZYRTEC ALLERGY PO        Refills:  0        * Notice:  This list has 3 medication(s) that are the same as other medications prescribed for you. Read the directions carefully, and ask your doctor or other care provider to review them with you.            Prescriptions were sent or printed at these locations (2 Prescriptions)                   Other Prescriptions                Printed at Department/Unit printer (2 of 2)         metroNIDAZOLE (FLAGYL) 500 MG tablet               valACYclovir (VALTREX) 1000 mg tablet                Procedures and tests performed during your visit     Chlamydia trachomatis PCR    HCG qualitative urine    HSV 1 and 2 DNA by PCR    Neisseria gonorrhoea PCR    Prep for procedure - pelvic exam    UA with Microscopic    Wet prep      Orders Needing Specimen Collection     None      Pending Results     Date and Time Order Name Status Description    11/3/2018 0333 HSV 1 and 2 DNA by PCR In process     11/3/2018 0222 Neisseria gonorrhoea PCR In process     11/3/2018 0222 Chlamydia trachomatis PCR In process              Pending Culture Results     Date and Time Order Name Status Description    11/3/2018 0333 HSV 1 and 2 DNA by PCR In process     11/3/2018 0222 Neisseria gonorrhoea PCR In process     11/3/2018 0222 Chlamydia trachomatis PCR In process             Pending Results Instructions     If you had any lab results that were not finalized at the time of your Discharge, you can call the ED Lab Result RN at [phone]. You will be contacted by this team for any positive Lab results or changes in treatment. The nurses are available 7 days a week from 10A to 6:30P.  You can leave a message 24 hours per day and they will return your call.        Test Results From Your Hospital Stay        11/3/2018  2:49 AM      Component Results     Component Value Ref Range & Units Status    Color Urine Straw  Final    Appearance Urine Slightly Cloudy  Final    Glucose Urine Negative NEG^Negative mg/dL Final    Bilirubin Urine Negative NEG^Negative Final    Ketones Urine Negative NEG^Negative mg/dL Final    Specific Gravity Urine 1.003 1.003 - 1.035 Final    Blood Urine Trace (A) NEG^Negative Final    pH Urine 5.5 5.0 - 7.0 pH Final    Protein Albumin Urine Negative NEG^Negative mg/dL Final    Urobilinogen mg/dL Normal 0.0 - 2.0 mg/dL Final    Nitrite Urine Negative NEG^Negative Final    Leukocyte Esterase Urine Moderate (A) NEG^Negative Final    Source Midstream Urine  Final    WBC Urine 1 0 - 5 /HPF Final    RBC Urine 1 0 - 2 /HPF Final    Bacteria Urine Few (A) NEG^Negative /HPF Final    Squamous Epithelial /HPF Urine 1 0 - 1 /HPF Final    Mucous Urine Present (A) NEG^Negative /LPF Final         11/3/2018  2:49 AM      Component Results     Component Value Ref Range & Units Status    HCG Qual Urine Negative NEG^Negative Final    This test is for screening purposes.  Results should be interpreted along with   the clinical picture.  Confirmation testing is available if warranted by   ordering [id number], HCG Quantitative Pregnancy.            11/3/2018  2:50 AM      Component Results     Component    Specimen Description    Cervix    Wet Prep    No Trichomonas seen    Wet Prep    No yeast seen    Wet Prep (Abnormal)    Clue cells seen    Wet Prep    Few  PMNs seen           11/3/2018  2:42 AM         11/3/2018  2:42 AM         11/3/2018  3:42 AM                Clinical Quality Measure: Blood Pressure Screening     Your blood pressure was checked while you were in the emergency department today. The last reading we obtained was  BP: 133/80 . Please read the guidelines below about what these numbers mean and what you should do about them.  If your systolic blood pressure (the top number) is less than 120 and your diastolic blood pressure (the bottom number) is less than 80, then your blood pressure is normal. There is nothing more that you need to do about it.  If your systolic blood pressure (the top number) is 120-139 or your diastolic blood pressure (the bottom number) is 80-89, your blood pressure may be higher than it should be. You should have your blood pressure rechecked within a year by a primary care provider.  If your systolic blood pressure (the top number) is 140 or greater or your diastolic blood pressure (the bottom number) is 90 or greater, you may have high blood pressure. High blood pressure is treatable, but if left untreated over time it can put you at risk for heart attack, stroke, or kidney failure. You should have your blood pressure rechecked by a primary care provider within the next 4 weeks.  If your provider in the emergency department today gave you specific instructions to follow-up with your doctor or provider even sooner than that, you should follow that instruction and not wait for up to 4 weeks for your follow-up visit.        Thank you for choosing [facility]       Thank you for choosing [facility] for your care. Our goal is always to provide you with excellent care. Hearing back from our patients is one way we can continue  "to improve our services. Please take a few minutes to complete the written survey that you may receive in the mail after you visit with us. Thank you!        [employer] Information     [employer] lets you send messages to your doctor, view your test results, renew your prescriptions, schedule appointments and more. To sign up, go to [email or web address] . Click on \"Log in\" on the left side of the screen, which will take you to the Welcome page. Then click on \"Sign up Now\" on the right side of the page.     You will be asked to enter the access code listed below, as well as some personal information. Please follow the directions to create your username and password.     Your access code is: [email or web address]  Expires: 2018  7:34 PM     Your access code will  in 90 days. If you need help or a new code, please call your [facility] clinic or [phone].        Care EveryWhere ID     This is your Care EveryWhere ID. This could be used by other organizations to access your [facility] medical records  [id number]        Equal Access to Services     [facility]: Hadii florentino Banuelos, waaxda luandria, qaybta kaalrosanne jaramillo, jemal reza. So [facility] [phone].    ATENCIÓN: Si habla español, tiene a godinez disposición servicios gratuitos de asistencia lingüística. Jed al [phone].    We comply with applicable federal civil rights laws and [place] laws. We do not discriminate on the basis of race, color, national origin, age, disability, sex, sexual orientation, or gender identity.            After Visit Summary       This is your record. Keep this with you and show to your community pharmacist(s) and doctor(s) at your next visit.                  "

## 2018-11-04 LAB
C TRACH DNA SPEC QL NAA+PROBE: NEGATIVE
N GONORRHOEA DNA SPEC QL NAA+PROBE: NEGATIVE
SPECIMEN SOURCE: NORMAL
SPECIMEN SOURCE: NORMAL

## 2018-11-05 LAB
HSV1 DNA SPEC QL NAA+PROBE: NEGATIVE
HSV2 DNA SPEC QL NAA+PROBE: NEGATIVE
SPECIMEN SOURCE: NORMAL

## 2019-10-29 ENCOUNTER — ANCILLARY PROCEDURE (OUTPATIENT)
Dept: GENERAL RADIOLOGY | Facility: CLINIC | Age: 24
End: 2019-10-29
Attending: PHYSICIAN ASSISTANT
Payer: COMMERCIAL

## 2019-10-29 ENCOUNTER — OFFICE VISIT (OUTPATIENT)
Dept: URGENT CARE | Facility: URGENT CARE | Age: 24
End: 2019-10-29
Payer: COMMERCIAL

## 2019-10-29 VITALS
DIASTOLIC BLOOD PRESSURE: 82 MMHG | SYSTOLIC BLOOD PRESSURE: 129 MMHG | HEART RATE: 90 BPM | OXYGEN SATURATION: 98 % | BODY MASS INDEX: 31.99 KG/M2 | WEIGHT: 192 LBS | TEMPERATURE: 97.5 F | HEIGHT: 65 IN | RESPIRATION RATE: 19 BRPM

## 2019-10-29 DIAGNOSIS — F41.9 ANXIETY: ICD-10-CM

## 2019-10-29 DIAGNOSIS — J45.31 MILD PERSISTENT ASTHMA WITH ACUTE EXACERBATION: Primary | ICD-10-CM

## 2019-10-29 DIAGNOSIS — R07.89 ATYPICAL CHEST PAIN: ICD-10-CM

## 2019-10-29 PROCEDURE — 99214 OFFICE O/P EST MOD 30 MIN: CPT | Performed by: PHYSICIAN ASSISTANT

## 2019-10-29 PROCEDURE — 93000 ELECTROCARDIOGRAM COMPLETE: CPT | Performed by: PHYSICIAN ASSISTANT

## 2019-10-29 PROCEDURE — 71046 X-RAY EXAM CHEST 2 VIEWS: CPT

## 2019-10-29 RX ORDER — FLUTICASONE PROPIONATE 110 UG/1
2 AEROSOL, METERED RESPIRATORY (INHALATION) 2 TIMES DAILY
Qty: 1 INHALER | Refills: 0 | Status: SHIPPED | OUTPATIENT
Start: 2019-10-29

## 2019-10-29 RX ORDER — ALBUTEROL SULFATE 0.83 MG/ML
2.5 SOLUTION RESPIRATORY (INHALATION)
COMMUNITY
Start: 2017-11-17

## 2019-10-29 ASSESSMENT — ENCOUNTER SYMPTOMS
DIARRHEA: 0
SHORTNESS OF BREATH: 1
CHILLS: 0
FOCAL WEAKNESS: 0
COUGH: 0
WHEEZING: 1
VOMITING: 0
FEVER: 0
PALPITATIONS: 1
NAUSEA: 0
HEADACHES: 0
ABDOMINAL PAIN: 0

## 2019-10-29 ASSESSMENT — MIFFLIN-ST. JEOR: SCORE: 1621.79

## 2019-10-29 NOTE — PROGRESS NOTES
HPI  October 29, 2019    HPI: Jackeline Colin is a 24 year old female with hx asthma who complains of moderate chest tightness, wheezing, and SOB onset 2 days ago. Pt ran out of her Flovent inhaler prior to onset of symptoms. She is using albuterol with some relief. She also uses Claritin & Flonase occasionally for seasonal allergies. Pt also notes an intermittent migratory chest pain that has been going on for about 1 year. The last few days it has been in her L upper and L lower chest, lasts a few seconds, and usually is accompanied by palpitations. She typically notices the chest pain when she is feeling stressed about something. She believes she may have anxiety but hasn't been diagnosed with this. She recently moved and started a new job. Chest pain does not radiate; no associated N/V or diaphoresis. Symptoms are constant in duration. Patient denies history of DVT/PE, recent pregnancy/travel/surgery, estrogen use, tobacco use, leg pain or swelling, or history of cancer.   Denies fever/chills, HA, cough, congestion, abd pain, N/V/D, rash, or any other symptoms. Patient denies sick contacts or hx of heart issues. Denies suicidal thoughts.    Past Medical History:   Diagnosis Date     Tonsil, abscess 6/6/2014    peritonsilar abscess     Uncomplicated asthma      Past Surgical History:   Procedure Laterality Date     NO HISTORY OF SURGERY       Social History     Tobacco Use     Smoking status: Never Smoker     Smokeless tobacco: Never Used   Substance Use Topics     Alcohol use: Yes     Drug use: No     Patient Active Problem List   Diagnosis     Tonsil, abscess     Peritonsillar abscess     Family History   Problem Relation Age of Onset     Hypertension Mother      Hyperlipidemia Mother         Problem list, Medication list, Allergies, and Medical/Social/Surgical histories reviewed in [facility] and updated as appropriate.      Review of Systems   Constitutional: Negative for chills and fever.   HENT: Negative for  "congestion.    Respiratory: Positive for shortness of breath and wheezing. Negative for cough.         Chest tightness   Cardiovascular: Positive for chest pain and palpitations. Negative for leg swelling.   Gastrointestinal: Negative for abdominal pain, diarrhea, nausea and vomiting.   Skin: Negative for rash.   Neurological: Negative for focal weakness and headaches.   All other systems reviewed and are negative.        Physical Exam  Vitals signs and nursing note reviewed.   HENT:      Head: Normocephalic and atraumatic.      Right Ear: Tympanic membrane and external ear normal.      Left Ear: Tympanic membrane and external ear normal.   Cardiovascular:      Rate and Rhythm: Normal rate and regular rhythm.      Heart sounds: Normal heart sounds.   Pulmonary:      Effort: Pulmonary effort is normal.      Breath sounds: Normal breath sounds.   Chest:      Chest wall: No tenderness.   Musculoskeletal: Normal range of motion.   Skin:     General: Skin is warm and dry.   Neurological:      Mental Status: She is alert and oriented to person, place, and time.   Psychiatric:         Mood and Affect: Mood normal.         Speech: Speech normal.         Behavior: Behavior normal.         Thought Content: Thought content normal.       Vital Signs  /82   Pulse 90   Temp 97.5  F (36.4  C) (Oral)   Resp 19   Ht 1.651 m (5' 5\")   Wt 87.1 kg (192 lb)   SpO2 98%   BMI 31.95 kg/m       Diagnostic Test Results:  CXR - negative per my read  EKG - appears normal, NSR, normal axis, normal intervals, no acute ST/T changes c/w ischemia, no LVH by voltage criteria, unchanged from previous tracings    ASSESSMENT/PLAN      ICD-10-CM    1. Mild persistent asthma with acute exacerbation J45.31 fluticasone (FLOVENT HFA) 110 MCG/ACT inhaler   2. Atypical chest pain R07.89 EKG 12-lead complete w/read - Clinics     XR Chest 2 Views   3. Anxiety F41.9         Lungs CTAB, no resp distress. EKG negative, CXR negative for " pneumonia/atelectasis/effusion per my read. No risk factors for PE or cardiac disease, not typical for ACS type pain. Chest pain has been intermittent for past year & associated with stress- suspect anxiety. F/u with PCP for this. Self-care discussed.  Pt also with asthma exacerbation past couple days,  likely due to combination of seasonal allergies and running out of her Flovent. Refilled Flovent, advised daily use of Claritin and Flonase. Does not need refills of albuterol- continue this PRN.    I have discussed any lab or imaging results, the patient's diagnosis, and my plan of treatment with the patient and/or family. Patient is aware to come back in if with worsening symptoms or if no relief despite treatment plan.  Patient voiced understanding and had no further questions.       Follow Up: Return in about 1 week (around 11/5/2019) for Recheck with primary care provider.    SCOTT James, PANilaC  [facility]

## 2019-10-29 NOTE — PATIENT INSTRUCTIONS
Continue albuterol as needed. Take Claritin & Flonase daily.    Patient Education     Anxiety Reaction  Anxiety is the feeling we all get when we think something bad might happen. It is a normal response to stress and usually causes only a mild reaction. When anxiety becomes more severe, it can interfere with daily life. In some cases, you may not even be aware of what it is you re anxious about. There may also be a genetic link or it may be a learned behavior in the home.  Both psychological and physical triggers cause stress reaction. It's often a response to fear or emotional stress, real or imagined. This stress may come from home, family, work, or social relationships.  During an anxiety reaction, you may feel:    Helpless    Nervous    Depressed    Irritable  Your body may show signs of anxiety in many ways. You may experience:    Dry mouth    Shakiness    Dizziness    Weakness    Trouble breathing    Breathing fast (hyperventilating)    Chest pressure    Sweating    Headache    Nausea    Diarrhea    Tiredness    Inability to sleep    Sexual problems  Home care    Try to locate the sources of stress in your life. They may not be obvious. These may include:  ? Daily hassles of life (such as traffic jams, missed appointments, or car troubles)  ? Major life changes, both good (new baby or job promotion) and bad (loss of job or loss of loved one)  ? Overload: feeling that you have too many responsibilities and can't take care of all of them at once  ? Feeling helpless or feeling that your problems are beyond what you re able to solve    Notice how your body reacts to stress. Learn to listen to your body signals. This will help you take action before the stress becomes severe.    When you can, do something about the source of your stress. (Avoid hassles, limit the amount of change that happens in your life at one time and take a break when you feel overloaded).    Unfortunately, many stressful situations can't be  avoided. It is necessary to learn how to better manage stress. There are many proven methods that will reduce your anxiety. These include simple things like exercise, good nutrition, and adequate rest. Also, there are certain techniques that are helpful:  ? Relaxation  ? Breathing exercises  ? Visualization  ? Biofeedback  ? Meditation  For more information about this, consult your healthcare provider or go to a local bookstore and review the many books and tapes available on this subject.  Follow-up care  If you feel that your anxiety is not responding to self-help measures, contact your healthcare provider or make an appointment with a counselor. You may need short-term psychological counseling and temporary medicine to help you manage stress.  Call 911  Call 911 if any of these happen:    Trouble breathing    Confusion    Drowsiness or trouble wakening    Fainting or loss of consciousness    Rapid heart rate    Seizure    New chest pain that becomes more severe, lasts longer, or spreads into your shoulder, arm, neck, jaw, or back  When to seek medical advice  Call your healthcare provider right away if any of these happen:    Your symptoms get worse    Severe headache not relieved by rest and mild pain reliever  Date Last Reviewed: 10/1/2017    1085-6251 [employer]. [address] [place] [address], [place] [address]. All rights reserved. This information is not intended as a substitute for professional medical care. Always follow your healthcare professional's instructions.

## 2019-11-12 ENCOUNTER — OFFICE VISIT (OUTPATIENT)
Dept: URGENT CARE | Facility: URGENT CARE | Age: 24
End: 2019-11-12
Payer: COMMERCIAL

## 2019-11-12 VITALS
OXYGEN SATURATION: 98 % | RESPIRATION RATE: 16 BRPM | WEIGHT: 194.8 LBS | HEART RATE: 78 BPM | DIASTOLIC BLOOD PRESSURE: 84 MMHG | TEMPERATURE: 98.1 F | SYSTOLIC BLOOD PRESSURE: 130 MMHG | BODY MASS INDEX: 32.42 KG/M2

## 2019-11-12 DIAGNOSIS — J45.31 MILD PERSISTENT ASTHMA WITH ACUTE EXACERBATION: Primary | ICD-10-CM

## 2019-11-12 PROCEDURE — 99214 OFFICE O/P EST MOD 30 MIN: CPT | Performed by: NURSE PRACTITIONER

## 2019-11-12 RX ORDER — ALBUTEROL SULFATE 90 UG/1
2 AEROSOL, METERED RESPIRATORY (INHALATION) EVERY 6 HOURS PRN
Qty: 1 INHALER | Refills: 2 | Status: SHIPPED | OUTPATIENT
Start: 2019-11-12 | End: 2019-11-26

## 2019-11-12 RX ORDER — ALBUTEROL SULFATE 0.83 MG/ML
2.5 SOLUTION RESPIRATORY (INHALATION) EVERY 6 HOURS PRN
Qty: 25 VIAL | Refills: 3 | Status: SHIPPED | OUTPATIENT
Start: 2019-11-12 | End: 2019-12-12

## 2019-11-12 ASSESSMENT — ENCOUNTER SYMPTOMS
CHEST TIGHTNESS: 1
VOMITING: 0
NAUSEA: 0
SORE THROAT: 0
SHORTNESS OF BREATH: 0
DIARRHEA: 0
COUGH: 1
CHILLS: 0
FEVER: 0
RHINORRHEA: 0
WHEEZING: 1
HEADACHES: 0

## 2019-11-12 NOTE — PROGRESS NOTES
SUBJECTIVE:   Jackeline Colin is a 24 year old female presenting with a chief complaint of   Chief Complaint   Patient presents with     Breathing Problem     started today and lungs hurt        She is an established patient of [facility].    UR Adult    Onset of symptoms was 1 day(s) ago.  Course of illness is worsening.    Severity moderate  Current and Associated symptoms: cough - non-productive, wheezing and chest tightness  Treatment measures tried include None tried.  Predisposing factors include HX of asthma.        Review of Systems   Constitutional: Negative for chills and fever.   HENT: Negative for congestion, ear pain, rhinorrhea and sore throat.    Respiratory: Positive for cough, chest tightness and wheezing. Negative for shortness of breath.    Gastrointestinal: Negative for diarrhea, nausea and vomiting.   Neurological: Negative for headaches.   All other systems reviewed and are negative.      Past Medical History:   Diagnosis Date     Tonsil, abscess 6/6/2014    peritonsilar abscess     Uncomplicated asthma      Family History   Problem Relation Age of Onset     Hypertension Mother      Hyperlipidemia Mother      Current Outpatient Medications   Medication Sig Dispense Refill     albuterol (PROAIR HFA/PROVENTIL HFA/VENTOLIN HFA) 108 (90 Base) MCG/ACT inhaler Inhale 2 puffs into the lungs every 6 hours as needed for wheezing 1 Inhaler 2     albuterol (PROVENTIL HFA) 108 (90 Base) MCG/ACT Inhaler Inhale 2 puffs into the lungs every 6 hours 1 Inhaler 3     albuterol (PROVENTIL) (2.5 MG/3ML) 0.083% neb solution Take 1 vial (2.5 mg) by nebulization every 6 hours as needed for shortness of breath / dyspnea or wheezing 25 vial 3     albuterol (PROVENTIL) (2.5 MG/3ML) 0.083% neb solution Inhale 2.5 mg into the lungs       fluticasone (FLONASE) 50 MCG/ACT spray 2 sprays       fluticasone (FLOVENT HFA) 110 MCG/ACT inhaler Inhale 2 puffs into the lungs 2 times daily 1 Inhaler 0     loratadine (CLARITIN) 10  MG capsule Take 10 mg by mouth daily       Social History     Tobacco Use     Smoking status: Never Smoker     Smokeless tobacco: Never Used   Substance Use Topics     Alcohol use: Yes       OBJECTIVE  /84 (BP Location: Left arm, Patient Position: Sitting, Cuff Size: Adult Large)   Pulse 78   Temp 98.1  F (36.7  C) (Oral)   Resp 16   Wt 88.4 kg (194 lb 12.8 oz)   LMP 11/07/2019 (Approximate)   SpO2 98%   BMI 32.42 kg/m      Physical Exam  Vitals signs and nursing note reviewed.   Constitutional:       General: She is not in acute distress.     Appearance: She is well-developed. She is not diaphoretic.   HENT:      Head: Normocephalic and atraumatic.      Right Ear: Tympanic membrane and external ear normal.      Left Ear: Tympanic membrane and external ear normal.   Eyes:      Pupils: Pupils are equal, round, and reactive to light.   Neck:      Musculoskeletal: Normal range of motion and neck supple.   Pulmonary:      Effort: Pulmonary effort is normal. No respiratory distress.      Breath sounds: Examination of the right-upper field reveals wheezing. Examination of the left-upper field reveals wheezing. Examination of the right-middle field reveals wheezing. Wheezing present.   Lymphadenopathy:      Cervical: No cervical adenopathy.   Skin:     General: Skin is warm and dry.   Neurological:      Mental Status: She is alert.      Cranial Nerves: No cranial nerve deficit.           ASSESSMENT:      ICD-10-CM    1. Mild persistent asthma with acute exacerbation J45.31 albuterol (PROAIR HFA/PROVENTIL HFA/VENTOLIN HFA) 108 (90 Base) MCG/ACT inhaler     albuterol (PROVENTIL) (2.5 MG/3ML) 0.083% neb solution        Medical Decision Making:    Differential Diagnosis:  URI Adult/Peds:  Asthma and Asthma exacerbation      PLAN:  Albuterol every 4 hours for the next 48 hours, then as needed.  I recommend follow up with PCP in 3 days or sooner if symptoms are getting worse  Side effects of medications  discussed  Over the counter medications discussed  All questions are answered and patient is in agreement with treatment plan  Patricia Varela  [facility]  Family Nurse Practitoner          Patient Instructions     Patient Education     Asthma (Adult)  Asthma is a disease where the medium and  small air passages within the lung go into spasm and restrict the flow of air. Inflammation and swelling of the airways cause further blockage. During an acute asthma attack, these factors cause trouble breathing, wheezing, cough and chest tightness.    An asthma attack can be triggered by many things. Common triggers include infections such as the common cold, bronchitis, and pneumonia. Irritants such as smoke or pollutants in the air, very cold air, emotional upset, and exercise can also trigger an attack. In many adults with asthma, allergies to dust, mold, pollen and animal dander can cause an asthma attack. Skipping doses of daily asthma medicine can also bring on an asthma attack.  Asthma can be controlled using the proper medicines prescribed by your healthcare provider and avoiding exposure to known triggers including allergens and irritants.  Home care    Take prescribed medicine exactly at the times advised. If you need medicine such as from a hand held inhaler or aerosol breathing machine more than every 4 hours, contact your healthcare provider or seek immediate medical attention. If prescribed an antibiotic or prednisone, take all of the medicine as prescribed, even if you are feeling better after a few days.    Don't smoke. Avoid being exposed to the smoke of others.    Some people with asthma have worsening of their symptoms when they take aspirin and non-steroidal or fever-reducing medicines like ibuprofen and naproxen. Talk to your healthcare provider if you think this may apply to you.  Follow-up care  Follow up with your healthcare provider, or as advised. Always bring all of your current medicines to any  "appointments with your healthcare provider. Also bring a complete list of medicines even those not taken for asthma. If you don't already have one, talk to your healthcare provider about developing your own \"Asthma Action Plan.\"  A pneumococcal (pneumonia) vaccine and yearly flu shot (every fall) are recommended. Ask your doctor about this.  When to seek medical advice  Call your healthcare provider right away if any of these occur:     Increased wheezing or shortness of breath    Need to use your inhalers more often than usual without relief    Fever of 100.4 F (38 C) or higher, or as directed by your healthcare provider    Coughing up lots of dark-colored or bloody sputum (mucus)    Chest pain with each breath    If you use a peak flow meter as part of an Asthma Action Plan, and you are still in the yellow zone (50% to 80%) 15 minutes after using inhaler medicine.  Call 911  Call 911 if any of the following occur    Trouble walking or talking because of shortness of breath    If you use a peak flow meter as part of an Asthma Action Plan and you are still in the red zone (less than 50%) 15 minutes after using inhaler medicine    Lips or fingernails turning gray or blue  Date Last Reviewed: 5/1/2017 2000-2018 [employer]. [address] [place] [address], [place] [address]. All rights reserved. This information is not intended as a substitute for professional medical care. Always follow your healthcare professional's instructions.                 "

## 2019-11-12 NOTE — PATIENT INSTRUCTIONS
"  Patient Education     Asthma (Adult)  Asthma is a disease where the medium and  small air passages within the lung go into spasm and restrict the flow of air. Inflammation and swelling of the airways cause further blockage. During an acute asthma attack, these factors cause trouble breathing, wheezing, cough and chest tightness.    An asthma attack can be triggered by many things. Common triggers include infections such as the common cold, bronchitis, and pneumonia. Irritants such as smoke or pollutants in the air, very cold air, emotional upset, and exercise can also trigger an attack. In many adults with asthma, allergies to dust, mold, pollen and animal dander can cause an asthma attack. Skipping doses of daily asthma medicine can also bring on an asthma attack.  Asthma can be controlled using the proper medicines prescribed by your healthcare provider and avoiding exposure to known triggers including allergens and irritants.  Home care    Take prescribed medicine exactly at the times advised. If you need medicine such as from a hand held inhaler or aerosol breathing machine more than every 4 hours, contact your healthcare provider or seek immediate medical attention. If prescribed an antibiotic or prednisone, take all of the medicine as prescribed, even if you are feeling better after a few days.    Don't smoke. Avoid being exposed to the smoke of others.    Some people with asthma have worsening of their symptoms when they take aspirin and non-steroidal or fever-reducing medicines like ibuprofen and naproxen. Talk to your healthcare provider if you think this may apply to you.  Follow-up care  Follow up with your healthcare provider, or as advised. Always bring all of your current medicines to any appointments with your healthcare provider. Also bring a complete list of medicines even those not taken for asthma. If you don't already have one, talk to your healthcare provider about developing your own \"Asthma " "Action Plan.\"  A pneumococcal (pneumonia) vaccine and yearly flu shot (every fall) are recommended. Ask your doctor about this.  When to seek medical advice  Call your healthcare provider right away if any of these occur:     Increased wheezing or shortness of breath    Need to use your inhalers more often than usual without relief    Fever of 100.4 F (38 C) or higher, or as directed by your healthcare provider    Coughing up lots of dark-colored or bloody sputum (mucus)    Chest pain with each breath    If you use a peak flow meter as part of an Asthma Action Plan, and you are still in the yellow zone (50% to 80%) 15 minutes after using inhaler medicine.  Call 911  Call 911 if any of the following occur    Trouble walking or talking because of shortness of breath    If you use a peak flow meter as part of an Asthma Action Plan and you are still in the red zone (less than 50%) 15 minutes after using inhaler medicine    Lips or fingernails turning gray or blue  Date Last Reviewed: 5/1/2017 2000-2018 [employer]. [address] [place] [address], [place] [address]. All rights reserved. This information is not intended as a substitute for professional medical care. Always follow your healthcare professional's instructions.           "

## 2021-12-19 ENCOUNTER — APPOINTMENT (OUTPATIENT)
Dept: CT IMAGING | Facility: CLINIC | Age: 26
End: 2021-12-19
Attending: NURSE PRACTITIONER
Payer: COMMERCIAL

## 2021-12-19 ENCOUNTER — HOSPITAL ENCOUNTER (EMERGENCY)
Facility: CLINIC | Age: 26
Discharge: HOME | End: 2021-12-19
Attending: NURSE PRACTITIONER | Admitting: NURSE PRACTITIONER
Payer: COMMERCIAL

## 2021-12-19 VITALS
RESPIRATION RATE: 18 BRPM | BODY MASS INDEX: 32.95 KG/M2 | HEIGHT: 64 IN | OXYGEN SATURATION: 97 % | SYSTOLIC BLOOD PRESSURE: 122 MMHG | HEART RATE: 111 BPM | TEMPERATURE: 97.8 F | WEIGHT: 193 LBS | DIASTOLIC BLOOD PRESSURE: 80 MMHG

## 2021-12-19 DIAGNOSIS — R79.89 D-DIMER, ELEVATED: ICD-10-CM

## 2021-12-19 DIAGNOSIS — R00.0 TACHYCARDIA: ICD-10-CM

## 2021-12-19 DIAGNOSIS — N39.0 URINARY TRACT INFECTION: ICD-10-CM

## 2021-12-19 PROBLEM — Z87.59 HISTORY OF PRE-ECLAMPSIA: Status: ACTIVE | Noted: 2021-08-18

## 2021-12-19 LAB
ALBUMIN SERPL-MCNC: 4 G/DL (ref 3.4–5)
ALBUMIN UR-MCNC: 20 MG/DL
ALP SERPL-CCNC: 127 U/L (ref 40–150)
ALT SERPL W P-5'-P-CCNC: 32 U/L (ref 0–50)
ANION GAP SERPL CALCULATED.3IONS-SCNC: 4 MMOL/L (ref 3–14)
APPEARANCE UR: ABNORMAL
AST SERPL W P-5'-P-CCNC: 18 U/L (ref 0–45)
ATRIAL RATE - MUSE: 121 BPM
BILIRUB SERPL-MCNC: 0.3 MG/DL (ref 0.2–1.3)
BILIRUB UR QL STRIP: NEGATIVE
BUN SERPL-MCNC: 12 MG/DL (ref 7–30)
CALCIUM SERPL-MCNC: 8.5 MG/DL (ref 8.5–10.1)
CHLORIDE BLD-SCNC: 108 MMOL/L (ref 94–109)
CO2 SERPL-SCNC: 27 MMOL/L (ref 20–32)
COLOR UR AUTO: ABNORMAL
CREAT SERPL-MCNC: 0.63 MG/DL (ref 0.52–1.04)
D DIMER PPP FEU-MCNC: 0.51 UG/ML FEU (ref 0–0.5)
DIASTOLIC BLOOD PRESSURE - MUSE: NORMAL MMHG
ERYTHROCYTE [DISTWIDTH] IN BLOOD BY AUTOMATED COUNT: 12.9 % (ref 10–15)
FLUAV RNA SPEC QL NAA+PROBE: NEGATIVE
FLUBV RNA RESP QL NAA+PROBE: NEGATIVE
GFR SERPL CREATININE-BSD FRML MDRD: >90 ML/MIN/1.73M2
GLUCOSE BLD-MCNC: 125 MG/DL (ref 70–99)
GLUCOSE UR STRIP-MCNC: NEGATIVE MG/DL
HCG UR QL: NEGATIVE
HCT VFR BLD AUTO: 42.4 % (ref 35–47)
HGB BLD-MCNC: 13.7 G/DL (ref 11.7–15.7)
HGB UR QL STRIP: ABNORMAL
HOLD SPECIMEN: NORMAL
INTERPRETATION ECG - MUSE: NORMAL
KETONES UR STRIP-MCNC: NEGATIVE MG/DL
LEUKOCYTE ESTERASE UR QL STRIP: ABNORMAL
LIPASE SERPL-CCNC: 162 U/L (ref 73–393)
MCH RBC QN AUTO: 25.5 PG (ref 26.5–33)
MCHC RBC AUTO-ENTMCNC: 32.3 G/DL (ref 31.5–36.5)
MCV RBC AUTO: 79 FL (ref 78–100)
MUCOUS THREADS #/AREA URNS LPF: PRESENT /LPF
NITRATE UR QL: NEGATIVE
P AXIS - MUSE: 60 DEGREES
PH UR STRIP: 6.5 [PH] (ref 5–7)
PLATELET # BLD AUTO: 203 10E3/UL (ref 150–450)
POTASSIUM BLD-SCNC: 3.6 MMOL/L (ref 3.4–5.3)
PR INTERVAL - MUSE: 146 MS
PROT SERPL-MCNC: 8.1 G/DL (ref 6.8–8.8)
QRS DURATION - MUSE: 98 MS
QT - MUSE: 314 MS
QTC - MUSE: 445 MS
R AXIS - MUSE: 109 DEGREES
RBC # BLD AUTO: 5.37 10E6/UL (ref 3.8–5.2)
RBC URINE: 1 /HPF
RSV RNA SPEC NAA+PROBE: NEGATIVE
SARS-COV-2 RNA RESP QL NAA+PROBE: NEGATIVE
SODIUM SERPL-SCNC: 139 MMOL/L (ref 133–144)
SP GR UR STRIP: 1.02 (ref 1–1.03)
SQUAMOUS EPITHELIAL: 74 /HPF
SYSTOLIC BLOOD PRESSURE - MUSE: NORMAL MMHG
T AXIS - MUSE: 39 DEGREES
TROPONIN I SERPL HS-MCNC: 18 NG/L
UROBILINOGEN UR STRIP-MCNC: 2 MG/DL
VENTRICULAR RATE- MUSE: 121 BPM
WBC # BLD AUTO: 6.4 10E3/UL (ref 4–11)
WBC URINE: 33 /HPF

## 2021-12-19 PROCEDURE — 82374 ASSAY BLOOD CARBON DIOXIDE: CPT | Performed by: NURSE PRACTITIONER

## 2021-12-19 PROCEDURE — 83690 ASSAY OF LIPASE: CPT | Performed by: NURSE PRACTITIONER

## 2021-12-19 PROCEDURE — 84484 ASSAY OF TROPONIN QUANT: CPT | Performed by: NURSE PRACTITIONER

## 2021-12-19 PROCEDURE — 250N000013 HC RX MED GY IP 250 OP 250 PS 637: Performed by: NURSE PRACTITIONER

## 2021-12-19 PROCEDURE — 96360 HYDRATION IV INFUSION INIT: CPT

## 2021-12-19 PROCEDURE — 258N000003 HC RX IP 258 OP 636: Performed by: NURSE PRACTITIONER

## 2021-12-19 PROCEDURE — 87637 SARSCOV2&INF A&B&RSV AMP PRB: CPT | Performed by: NURSE PRACTITIONER

## 2021-12-19 PROCEDURE — 74177 CT ABD & PELVIS W/CONTRAST: CPT

## 2021-12-19 PROCEDURE — C9803 HOPD COVID-19 SPEC COLLECT: HCPCS

## 2021-12-19 PROCEDURE — 36415 COLL VENOUS BLD VENIPUNCTURE: CPT | Performed by: NURSE PRACTITIONER

## 2021-12-19 PROCEDURE — 250N000011 HC RX IP 250 OP 636: Performed by: NURSE PRACTITIONER

## 2021-12-19 PROCEDURE — 81025 URINE PREGNANCY TEST: CPT | Performed by: NURSE PRACTITIONER

## 2021-12-19 PROCEDURE — 82040 ASSAY OF SERUM ALBUMIN: CPT | Performed by: NURSE PRACTITIONER

## 2021-12-19 PROCEDURE — 87086 URINE CULTURE/COLONY COUNT: CPT | Performed by: NURSE PRACTITIONER

## 2021-12-19 PROCEDURE — 250N000009 HC RX 250: Performed by: NURSE PRACTITIONER

## 2021-12-19 PROCEDURE — 93005 ELECTROCARDIOGRAM TRACING: CPT

## 2021-12-19 PROCEDURE — 85379 FIBRIN DEGRADATION QUANT: CPT | Performed by: NURSE PRACTITIONER

## 2021-12-19 PROCEDURE — 81003 URINALYSIS AUTO W/O SCOPE: CPT | Performed by: NURSE PRACTITIONER

## 2021-12-19 PROCEDURE — 85014 HEMATOCRIT: CPT | Performed by: NURSE PRACTITIONER

## 2021-12-19 PROCEDURE — 99285 EMERGENCY DEPT VISIT HI MDM: CPT | Mod: 25

## 2021-12-19 RX ORDER — IOPAMIDOL 755 MG/ML
75 INJECTION, SOLUTION INTRAVASCULAR ONCE
Status: COMPLETED | OUTPATIENT
Start: 2021-12-19 | End: 2021-12-19

## 2021-12-19 RX ORDER — CEPHALEXIN 500 MG/1
500 CAPSULE ORAL 3 TIMES DAILY
Qty: 21 CAPSULE | Refills: 0 | Status: SHIPPED | OUTPATIENT
Start: 2021-12-19 | End: 2021-12-26

## 2021-12-19 RX ORDER — CEPHALEXIN 500 MG/1
500 CAPSULE ORAL ONCE
Status: COMPLETED | OUTPATIENT
Start: 2021-12-19 | End: 2021-12-19

## 2021-12-19 RX ADMIN — CEPHALEXIN 500 MG: 500 CAPSULE ORAL at 19:17

## 2021-12-19 RX ADMIN — IOPAMIDOL 75 ML: 755 INJECTION, SOLUTION INTRAVENOUS at 18:49

## 2021-12-19 RX ADMIN — SODIUM CHLORIDE 1000 ML: 9 INJECTION, SOLUTION INTRAVENOUS at 18:06

## 2021-12-19 RX ADMIN — SODIUM CHLORIDE 90 ML: 900 INJECTION INTRAVENOUS at 18:49

## 2021-12-19 ASSESSMENT — ENCOUNTER SYMPTOMS
COUGH: 0
SHORTNESS OF BREATH: 0
HEMATURIA: 1
FEVER: 0
VOMITING: 0
CHILLS: 0
DYSURIA: 0
BACK PAIN: 1
NAUSEA: 1
ABDOMINAL PAIN: 0

## 2021-12-19 ASSESSMENT — MIFFLIN-ST. JEOR: SCORE: 1600.44

## 2021-12-19 NOTE — ED PROVIDER NOTES
History     Chief Complaint:  Hematuria and Back Pain      HPI   Jackeline Colin is a 26 year old female who presents with body aches and back pain since this morning.  She was concerned for UTI after seeing blood in her urine earlier today.  She did take Tylenol prior to arrival but did not feel that she had a fever.  She has had Nausea, no vomiting.  She denies smoking.  She is approximately 7 months postpartum.  Does have a history of pyelonephritis.  It does states she had some gallbladder issues when she was pregnant as well.  After Tylenol use her symptoms of back pain have resolved.  She is most concerned about urinary tract infection at this time.  No URI symptoms or concerns for Covid.  She does not have any known exposure.  No history of kidney stones. Her last menstrual cycle 2 weeks ago.    Review of Systems   Constitutional: Negative for chills and fever.   Respiratory: Negative for cough and shortness of breath.    Gastrointestinal: Positive for nausea. Negative for abdominal pain and vomiting.   Genitourinary: Positive for hematuria. Negative for dysuria.   Musculoskeletal: Positive for back pain (Resolved).   All other systems reviewed and are negative.        Allergies:  Dog Epithelium  Cats  Dust Mites  Flagyl [Metronidazole]  Nkda [No Known Drug Allergies]  Seasonal Allergies      Medications:    cephALEXin (KEFLEX) 500 MG capsule  albuterol (PROVENTIL HFA) 108 (90 Base) MCG/ACT Inhaler  albuterol (PROVENTIL) (2.5 MG/3ML) 0.083% neb solution  fluticasone (FLONASE) 50 MCG/ACT spray  fluticasone (FLOVENT HFA) 110 MCG/ACT inhaler  loratadine (CLARITIN) 10 MG capsule        Past Medical History:      Past Medical History:   Diagnosis Date     Tonsil, abscess 6/6/2014     Uncomplicated asthma      Patient Active Problem List    Diagnosis Date Noted     History of pre-eclampsia 08/18/2021     Priority: Medium     Formatting of this note might be different from the original.  2021:  PEC with severe  "features       Mild intermittent asthma without complication 07/22/2016     Priority: Medium     Tonsil, abscess 06/06/2014     Priority: Medium     peritonsilar abscess       Peritonsillar abscess 06/06/2014     Priority: Medium        Past Surgical History:      Past Surgical History:   Procedure Laterality Date     NO HISTORY OF SURGERY         Family History:      Family History   Problem Relation Age of Onset     Hypertension Mother      Hyperlipidemia Mother        Social History:  Presents with   Has 1 child    Physical Exam     Patient Vitals for the past 24 hrs:   BP Temp Temp src Pulse Resp SpO2 Height Weight   12/19/21 1930 122/80 97.8  F (36.6  C) Oral 111 -- 97 % -- --   12/19/21 1900 -- -- -- 109 18 98 % -- --   12/19/21 1830 121/66 -- -- 113 16 96 % -- --   12/19/21 1815 -- -- -- 115 -- 95 % -- --   12/19/21 1800 130/88 -- -- -- 18 96 % -- --   12/19/21 1727 -- 97.6  F (36.4  C) Oral -- -- -- -- --   12/19/21 1726 (!) 150/75 -- -- (!) 138 16 97 % 1.626 m (5' 4\") 87.5 kg (193 lb)       Physical Exam  General: Alert, No obvious discomfort, well kept, obese  Eyes: PERRL, conjunctivae pink no scleral icterus or conjunctival injection  ENT:   Moist mucus membranes, posterior oropharynx clear without erythema or exudates, No lymphadenopathy, Normal voice  Resp:  Lungs clear to auscultation bilaterally, no crackles/rubs/wheezes. Good air movement  CV:  Normal rate and rhythm, no murmurs/rubs/gallops  GI:  Abdomen soft and non-distended.  Normoactive BS.  No tenderness, guarding or rebound, No masses  Skin:  Warm, dry.  No rashes or petechiae  Musculoskeletal: No peripheral edema or calf tenderness, Normal gross ROM   Neuro: Alert and oriented to person/place/time, normal sensation  Psychiatric: Normal affect, cooperative, good eye contact      Emergency Department Course   ECG:  ECG taken at 1801, ECG read at 1806  Other than tachycardia, No significant change as compared to prior, dated " 10/29/19  Rate 121 bpm. SC interval 146 ms. QRS duration 98 ms. QT/QTc 314/445 ms. P-R-T axes 60 109 39.     Imaging:  CT Chest (PE) Abdomen Pelvis w Contrast   Final Result   IMPRESSION:   1.  Mild to moderate diffuse air trapping often associated with small vessel or small airways disease. No acute infiltrate or effusion.   2.  Small fat-containing paraumbilical hernia.   3.  No pulmonary embolus aortic aneurysm or dissection.          Laboratory:  Labs Ordered and Resulted from Time of ED Arrival to Time of ED Departure   ROUTINE UA WITH MICROSCOPIC REFLEX TO CULTURE - Abnormal       Result Value    Color Urine Orange (*)     Appearance Urine Slightly Cloudy (*)     Glucose Urine Negative      Bilirubin Urine Negative      Ketones Urine Negative      Specific Gravity Urine 1.025      Blood Urine Moderate (*)     pH Urine 6.5      Protein Albumin Urine 20  (*)     Urobilinogen Urine 2.0      Nitrite Urine Negative      Leukocyte Esterase Urine Large (*)     Mucus Urine Present (*)     RBC Urine 1      WBC Urine 33 (*)     Squamous Epithelials Urine 74 (*)    CBC WITH PLATELETS - Abnormal    WBC Count 6.4      RBC Count 5.37 (*)     Hemoglobin 13.7      Hematocrit 42.4      MCV 79      MCH 25.5 (*)     MCHC 32.3      RDW 12.9      Platelet Count 203     COMPREHENSIVE METABOLIC PANEL - Abnormal    Sodium 139      Potassium 3.6      Chloride 108      Carbon Dioxide (CO2) 27      Anion Gap 4      Urea Nitrogen 12      Creatinine 0.63      Calcium 8.5      Glucose 125 (*)     Alkaline Phosphatase 127      AST 18      ALT 32      Protein Total 8.1      Albumin 4.0      Bilirubin Total 0.3      GFR Estimate >90     D DIMER QUANTITATIVE - Abnormal    D-Dimer Quantitative 0.51 (*)    HCG QUALITATIVE URINE - Normal    hCG Urine Qualitative Negative     LIPASE - Normal    Lipase 162     TROPONIN I - Normal    Troponin I High Sensitivity 18     INFLUENZA A/B & SARS-COV2 PCR MULTIPLEX   URINE CULTURE       Emergency Department  Course:      Reviewed:  I reviewed nursing notes, vitals and past history    Assessments:   I obtained history and examined the patient as noted above.    I rechecked the patient and explained findings.     Interventions:  Medications   0.9% sodium chloride BOLUS (0 mLs Intravenous Stopped 12/19/21 1853)   Saline (90 mLs As instructed Given 12/19/21 1849)   iopamidol (ISOVUE-370) solution 75 mL (75 mLs Intravenous Given 12/19/21 1849)   cephALEXin (KEFLEX) capsule 500 mg (500 mg Oral Given 12/19/21 1917)       Disposition:  The patient was discharged to home.    Impression & Plan        Medical Decision Making:  Jackeline Colin is a 26 year old female who presents today for evaluation of back pain and hematuria.  She also noted some mild tachycardia.  She is concerned for kidney infection as she has had this in the past.  Her evaluation showed no abdominal tenderness.  She was tachycardic however this became more normalized after fluids.  Her pain had resolved with over-the-counter medications.  Given the tachycardia I did obtain a D-dimer which was mildly elevated therefore I obtained a CT chest abdomen pelvis with PE study.  There was no evidence of PE or other intra-abdominal process.  At this point Cov[facility] test is pending and [facility]  Her urinalysis is consistent with urinary tract infection and expect her symptoms are related to urinary infection.  She is given her first dose of Keflex here.  Given a prescription for Keflex and advised on appropriate return protocols.  At this point there is no indication for further testing or imaging.  Again no signs of ACS she did have a negative troponin and EKG.  She appears to be safe and appropriate for outpatient management follow-up and is discharged home.      Covid-19  Jackeline Colin was evaluated during a global COVID-19 pandemic, which necessitated consideration that the patient might be at risk for infection with the SARS-CoV-2 virus  that causes COVID-19.   Applicable protocols for evaluation were followed during the patient's care.   COVID-19 was considered as part of the patient's evaluation. The plan for testing is:  a test was obtained during this visit.     Diagnosis:    ICD-10-CM    1. Urinary tract infection  N39.0    2. Tachycardia  R00.0    3. D-dimer, elevated  R79.89        Discharge Medications:  New Prescriptions    CEPHALEXIN (KEFLEX) 500 MG CAPSULE    Take 1 capsule (500 mg) by mouth 3 times daily for 7 days          Art Peacock, BOY CNP  12/19/21 1957

## 2021-12-20 LAB — BACTERIA UR CULT: NORMAL

## 2021-12-20 NOTE — RESULT ENCOUNTER NOTE
[facility] Emergency Dept discharge antibiotic (if prescribed): Cephalexin (Keflex) 500 mg capsule, 1 capsule (500 mg) by mouth 3 times daily for 7 days.   Date of Rx (if applicable):  12/19/21  No changes in treatment per [facility] ED Lab Result Urine culture protocol.

## 2021-12-20 NOTE — DISCHARGE INSTRUCTIONS
Take antibiotics.  Make sure and finish the entire course.  Drink plenty of fluids.  Follow-up with MyChart for your Covid results.

## 2021-12-20 NOTE — RESULT ENCOUNTER NOTE
Negative for Influenza A, Influenza B, RSV and Covid19.  Patient will receive the Covid19 result via [employer] and a letter will be sent via [employer] (if active) or via the mail

## 2024-09-24 ENCOUNTER — HOSPITAL ENCOUNTER (EMERGENCY)
Facility: CLINIC | Age: 29
Discharge: HOME | End: 2024-09-24
Attending: EMERGENCY MEDICINE | Admitting: EMERGENCY MEDICINE
Payer: COMMERCIAL

## 2024-09-24 ENCOUNTER — APPOINTMENT (OUTPATIENT)
Dept: ULTRASOUND IMAGING | Facility: CLINIC | Age: 29
End: 2024-09-24
Attending: STUDENT IN AN ORGANIZED HEALTH CARE EDUCATION/TRAINING PROGRAM
Payer: COMMERCIAL

## 2024-09-24 VITALS
SYSTOLIC BLOOD PRESSURE: 113 MMHG | HEART RATE: 94 BPM | RESPIRATION RATE: 18 BRPM | OXYGEN SATURATION: 96 % | DIASTOLIC BLOOD PRESSURE: 73 MMHG | TEMPERATURE: 98 F

## 2024-09-24 DIAGNOSIS — O20.8 SUBCHORIONIC HEMORRHAGE IN FIRST TRIMESTER: ICD-10-CM

## 2024-09-24 LAB
ABO/RH(D): NORMAL
ANTIBODY SCREEN: NEGATIVE
BASOPHILS # BLD AUTO: 0 10E3/UL (ref 0–0.2)
BASOPHILS NFR BLD AUTO: 0 %
EOSINOPHIL # BLD AUTO: 0.3 10E3/UL (ref 0–0.7)
EOSINOPHIL NFR BLD AUTO: 4 %
ERYTHROCYTE [DISTWIDTH] IN BLOOD BY AUTOMATED COUNT: 13.7 % (ref 10–15)
HCG INTACT+B SERPL-ACNC: ABNORMAL MIU/ML
HCT VFR BLD AUTO: 40.6 % (ref 35–47)
HGB BLD-MCNC: 13.3 G/DL (ref 11.7–15.7)
IMM GRANULOCYTES # BLD: 0 10E3/UL
IMM GRANULOCYTES NFR BLD: 0 %
LYMPHOCYTES # BLD AUTO: 2.6 10E3/UL (ref 0.8–5.3)
LYMPHOCYTES NFR BLD AUTO: 29 %
MCH RBC QN AUTO: 25.5 PG (ref 26.5–33)
MCHC RBC AUTO-ENTMCNC: 32.8 G/DL (ref 31.5–36.5)
MCV RBC AUTO: 78 FL (ref 78–100)
MONOCYTES # BLD AUTO: 0.4 10E3/UL (ref 0–1.3)
MONOCYTES NFR BLD AUTO: 5 %
NEUTROPHILS # BLD AUTO: 5.7 10E3/UL (ref 1.6–8.3)
NEUTROPHILS NFR BLD AUTO: 63 %
NRBC # BLD AUTO: 0 10E3/UL
NRBC BLD AUTO-RTO: 0 /100
PLATELET # BLD AUTO: 225 10E3/UL (ref 150–450)
RBC # BLD AUTO: 5.22 10E6/UL (ref 3.8–5.2)
SPECIMEN EXPIRATION DATE: NORMAL
WBC # BLD AUTO: 9.1 10E3/UL (ref 4–11)

## 2024-09-24 PROCEDURE — 85025 COMPLETE CBC W/AUTO DIFF WBC: CPT | Performed by: STUDENT IN AN ORGANIZED HEALTH CARE EDUCATION/TRAINING PROGRAM

## 2024-09-24 PROCEDURE — 86900 BLOOD TYPING SEROLOGIC ABO: CPT | Performed by: STUDENT IN AN ORGANIZED HEALTH CARE EDUCATION/TRAINING PROGRAM

## 2024-09-24 PROCEDURE — 99285 EMERGENCY DEPT VISIT HI MDM: CPT | Mod: 25

## 2024-09-24 PROCEDURE — 36415 COLL VENOUS BLD VENIPUNCTURE: CPT | Performed by: STUDENT IN AN ORGANIZED HEALTH CARE EDUCATION/TRAINING PROGRAM

## 2024-09-24 PROCEDURE — 84702 CHORIONIC GONADOTROPIN TEST: CPT | Performed by: STUDENT IN AN ORGANIZED HEALTH CARE EDUCATION/TRAINING PROGRAM

## 2024-09-24 PROCEDURE — 76801 OB US < 14 WKS SINGLE FETUS: CPT

## 2024-09-24 ASSESSMENT — ACTIVITIES OF DAILY LIVING (ADL)
ADLS_ACUITY_SCORE: 35

## 2024-09-24 ASSESSMENT — COLUMBIA-SUICIDE SEVERITY RATING SCALE - C-SSRS
1. IN THE PAST MONTH, HAVE YOU WISHED YOU WERE DEAD OR WISHED YOU COULD GO TO SLEEP AND NOT WAKE UP?: NO
6. HAVE YOU EVER DONE ANYTHING, STARTED TO DO ANYTHING, OR PREPARED TO DO ANYTHING TO END YOUR LIFE?: NO
2. HAVE YOU ACTUALLY HAD ANY THOUGHTS OF KILLING YOURSELF IN THE PAST MONTH?: NO

## 2024-09-24 NOTE — ED TRIAGE NOTES
Patient is about 8-9 weeks pregnant, complains of vaginal bleeding noted on her toilet paper, along with a small blood clot about the size of a dime. Denies any pain.

## 2024-09-25 NOTE — ED PROVIDER NOTES
Emergency Department Note      History of Present Illness   Chief Complaint   Vaginal Bleeding    HPI   Jackeline Colin is a  28 year old female with a history of pre-eclampsia and tachycardia who presents with her mother for an evaluation of vaginal bleeding. The patient stated two nights ago she saw pink blood when wiping. She added it occurred again the following night. She stated today having heavier bleeding with blood clots. She added having to use a pad and feeling the blood come out. She stated having cramps. She denies nausea and vomiting. She also denies a history of miscarriages. No specific activity associated with onset.    Independent Historian   None    Review of External Notes   Two prior ultrasounds, the most recent with fetal bradycardia    Past Medical History   Medical History and Problem List   Tonsil abscess  Asthma   Pre-eclampsia   Polycystic ovarian syndrome   Tachycardia     Medications   Escitalopram   Levalbuterol  Albuterol   Physical Exam   Patient Vitals for the past 24 hrs:   BP Temp Temp src Pulse Resp SpO2   24 2130 113/73 -- -- 94 -- 96 %   24 2100 124/53 -- -- 103 -- 96 %   24 1834 (!) 143/86 -- -- -- -- --   24 1833 -- 98  F (36.7  C) Oral 107 18 99 %     Physical Exam  Resp:  Non-labored  Neuro:  Alert and cooperative  MSkel:  Moving all extremities  Skin:  No rash  Pelvic:  Deferred at patient request    Diagnostics   Lab Results   Labs Ordered and Resulted from Time of ED Arrival to Time of ED Departure   HCG QUANTITATIVE PREGNANCY - Abnormal       Result Value    hCG Quantitative 14,623 (*)    CBC WITH PLATELETS AND DIFFERENTIAL - Abnormal    WBC Count 9.1      RBC Count 5.22 (*)     Hemoglobin 13.3      Hematocrit 40.6      MCV 78      MCH 25.5 (*)     MCHC 32.8      RDW 13.7      Platelet Count 225      % Neutrophils 63      % Lymphocytes 29      % Monocytes 5      % Eosinophils 4      % Basophils 0      % Immature Granulocytes 0      NRBCs  per 100 WBC 0      Absolute Neutrophils 5.7      Absolute Lymphocytes 2.6      Absolute Monocytes 0.4      Absolute Eosinophils 0.3      Absolute Basophils 0.0      Absolute Immature Granulocytes 0.0      Absolute NRBCs 0.0     TYPE AND SCREEN, ADULT    ABO/RH(D) O POS      Antibody Screen Negative      SPECIMEN EXPIRATION DATE [id number]     ABO/RH TYPE AND SCREEN     Imaging   OB  US 1st trimester w transvag   Final Result   IMPRESSION:    1.  Single living intrauterine gestation at 8 weeks, 1 day, EDC 5/13/2025.   2.  Findings compatible with a moderate-sized subchorionic hemorrhage.              EKG   None     Independent Interpretation   None  ED Course    Medications Administered   None     Procedures   None      Discussion of Management   None    ED Course   ED Course as of 09/24/24 2208   Tue Sep 24, 2024   1926 I obtained history and examined the patient as noted above.    2002 I rechecked and updated the patient. The patient denied the pelvic exam as she is too anxious.    2200 I rechecked and updated the patient.      Additional Documentation  None  Medical Decision Making / Diagnosis   CMS Diagnoses: None    MIPS       None    MDM   Differential includes miscarriage, subchorionic hemorrhage, ectopic pregnancy, among others.  Rh status is positive.  Rhogam is not indicated.  Blood work without anemia.  HCG continues to rise.  Planned for pelvic prior to US to remove any clots or tissue from the vaginal vault.  However patient has been very anxious throughout encounter and was feeling this rising in anticipation of the evaluation.  Can start with ultrasound but if miscarriage POC can be sometimes difficult to discern from vaginal products.  May still need pelvic after if miscarriage and ongoing heavy bleeding.  IUP with better heart rate and continued growth than prior.  Discussed subchorionic hemorrhage.  Anticipate will have more bleeding until pregnancy further along.  Follow up with  OB/Gyn.    Disposition   The patient was discharged.     Diagnosis     ICD-10-CM    1. Subchorionic hemorrhage in first trimester  O20.8          Discharge Medications   New Prescriptions    No medications on file     Scribe Disclosure:  I, Nidia Berger, am serving as a scribe at 7:21 PM on 9/24/2024 to document services personally performed by Kimber Espinoza MD, based on my observations and the provider's statements to me.      Kimber Espinoza MD  09/25/24 6539

## 2025-02-06 ENCOUNTER — APPOINTMENT (OUTPATIENT)
Dept: GENERAL RADIOLOGY | Facility: CLINIC | Age: 30
End: 2025-02-06
Attending: EMERGENCY MEDICINE
Payer: COMMERCIAL

## 2025-02-06 ENCOUNTER — APPOINTMENT (OUTPATIENT)
Dept: ULTRASOUND IMAGING | Facility: CLINIC | Age: 30
End: 2025-02-06
Attending: EMERGENCY MEDICINE
Payer: COMMERCIAL

## 2025-02-06 ENCOUNTER — HOSPITAL ENCOUNTER (EMERGENCY)
Facility: CLINIC | Age: 30
Discharge: HOME | End: 2025-02-06
Attending: EMERGENCY MEDICINE
Payer: COMMERCIAL

## 2025-02-06 VITALS
OXYGEN SATURATION: 99 % | HEART RATE: 118 BPM | SYSTOLIC BLOOD PRESSURE: 127 MMHG | RESPIRATION RATE: 20 BRPM | TEMPERATURE: 98.1 F | DIASTOLIC BLOOD PRESSURE: 83 MMHG

## 2025-02-06 DIAGNOSIS — R07.9 CHEST PAIN, UNSPECIFIED TYPE: ICD-10-CM

## 2025-02-06 DIAGNOSIS — Z33.1 PREGNANT STATE, INCIDENTAL: ICD-10-CM

## 2025-02-06 DIAGNOSIS — R06.02 SHORTNESS OF BREATH: ICD-10-CM

## 2025-02-06 LAB
ALBUMIN SERPL BCG-MCNC: 4.4 G/DL (ref 3.5–5.2)
ALP SERPL-CCNC: 125 U/L (ref 40–150)
ALT SERPL W P-5'-P-CCNC: 106 U/L (ref 0–50)
ANION GAP SERPL CALCULATED.3IONS-SCNC: 16 MMOL/L (ref 7–15)
AST SERPL W P-5'-P-CCNC: 44 U/L (ref 0–45)
BASOPHILS # BLD AUTO: 0 10E3/UL (ref 0–0.2)
BASOPHILS NFR BLD AUTO: 0 %
BILIRUB DIRECT SERPL-MCNC: <0.2 MG/DL (ref 0–0.3)
BILIRUB SERPL-MCNC: 0.4 MG/DL
BUN SERPL-MCNC: 5.3 MG/DL (ref 6–20)
CALCIUM SERPL-MCNC: 9.9 MG/DL (ref 8.8–10.4)
CHLORIDE SERPL-SCNC: 100 MMOL/L (ref 98–107)
CREAT SERPL-MCNC: 0.5 MG/DL (ref 0.51–0.95)
D DIMER PPP FEU-MCNC: 0.45 UG/ML FEU (ref 0–0.5)
EGFRCR SERPLBLD CKD-EPI 2021: >90 ML/MIN/1.73M2
EOSINOPHIL # BLD AUTO: 0.1 10E3/UL (ref 0–0.7)
EOSINOPHIL NFR BLD AUTO: 1 %
ERYTHROCYTE [DISTWIDTH] IN BLOOD BY AUTOMATED COUNT: 13.6 % (ref 10–15)
GLUCOSE SERPL-MCNC: 109 MG/DL (ref 70–99)
HCO3 SERPL-SCNC: 21 MMOL/L (ref 22–29)
HCT VFR BLD AUTO: 43.2 % (ref 35–47)
HGB BLD-MCNC: 14.8 G/DL (ref 11.7–15.7)
HOLD SPECIMEN: NORMAL
HOLD SPECIMEN: NORMAL
IMM GRANULOCYTES # BLD: 0 10E3/UL
IMM GRANULOCYTES NFR BLD: 0 %
LYMPHOCYTES # BLD AUTO: 1.7 10E3/UL (ref 0.8–5.3)
LYMPHOCYTES NFR BLD AUTO: 18 %
MCH RBC QN AUTO: 25.9 PG (ref 26.5–33)
MCHC RBC AUTO-ENTMCNC: 34.3 G/DL (ref 31.5–36.5)
MCV RBC AUTO: 76 FL (ref 78–100)
MONOCYTES # BLD AUTO: 0.3 10E3/UL (ref 0–1.3)
MONOCYTES NFR BLD AUTO: 3 %
NEUTROPHILS # BLD AUTO: 7.4 10E3/UL (ref 1.6–8.3)
NEUTROPHILS NFR BLD AUTO: 78 %
NRBC # BLD AUTO: 0 10E3/UL
NRBC BLD AUTO-RTO: 0 /100
NT-PROBNP SERPL-MCNC: <36 PG/ML (ref 0–450)
PLATELET # BLD AUTO: 211 10E3/UL (ref 150–450)
POTASSIUM SERPL-SCNC: 3.4 MMOL/L (ref 3.4–5.3)
PROT SERPL-MCNC: 8.1 G/DL (ref 6.4–8.3)
RBC # BLD AUTO: 5.72 10E6/UL (ref 3.8–5.2)
SODIUM SERPL-SCNC: 137 MMOL/L (ref 135–145)
TROPONIN T SERPL HS-MCNC: <6 NG/L
WBC # BLD AUTO: 9.5 10E3/UL (ref 4–11)

## 2025-02-06 PROCEDURE — 85379 FIBRIN DEGRADATION QUANT: CPT | Performed by: EMERGENCY MEDICINE

## 2025-02-06 PROCEDURE — 99284 EMERGENCY DEPT VISIT MOD MDM: CPT | Mod: 25

## 2025-02-06 PROCEDURE — 82374 ASSAY BLOOD CARBON DIOXIDE: CPT | Performed by: EMERGENCY MEDICINE

## 2025-02-06 PROCEDURE — 84484 ASSAY OF TROPONIN QUANT: CPT | Performed by: EMERGENCY MEDICINE

## 2025-02-06 PROCEDURE — 83880 ASSAY OF NATRIURETIC PEPTIDE: CPT | Performed by: EMERGENCY MEDICINE

## 2025-02-06 PROCEDURE — 36415 COLL VENOUS BLD VENIPUNCTURE: CPT | Performed by: EMERGENCY MEDICINE

## 2025-02-06 PROCEDURE — 85025 COMPLETE CBC W/AUTO DIFF WBC: CPT | Performed by: EMERGENCY MEDICINE

## 2025-02-06 PROCEDURE — 80048 BASIC METABOLIC PNL TOTAL CA: CPT | Performed by: EMERGENCY MEDICINE

## 2025-02-06 PROCEDURE — 85041 AUTOMATED RBC COUNT: CPT | Performed by: EMERGENCY MEDICINE

## 2025-02-06 PROCEDURE — 82310 ASSAY OF CALCIUM: CPT | Performed by: EMERGENCY MEDICINE

## 2025-02-06 PROCEDURE — 93970 EXTREMITY STUDY: CPT

## 2025-02-06 PROCEDURE — 82248 BILIRUBIN DIRECT: CPT | Performed by: EMERGENCY MEDICINE

## 2025-02-06 PROCEDURE — 71046 X-RAY EXAM CHEST 2 VIEWS: CPT

## 2025-02-06 PROCEDURE — 85004 AUTOMATED DIFF WBC COUNT: CPT | Performed by: EMERGENCY MEDICINE

## 2025-02-06 ASSESSMENT — ACTIVITIES OF DAILY LIVING (ADL)
ADLS_ACUITY_SCORE: 41

## 2025-02-06 ASSESSMENT — COLUMBIA-SUICIDE SEVERITY RATING SCALE - C-SSRS
2. HAVE YOU ACTUALLY HAD ANY THOUGHTS OF KILLING YOURSELF IN THE PAST MONTH?: NO
1. IN THE PAST MONTH, HAVE YOU WISHED YOU WERE DEAD OR WISHED YOU COULD GO TO SLEEP AND NOT WAKE UP?: NO
6. HAVE YOU EVER DONE ANYTHING, STARTED TO DO ANYTHING, OR PREPARED TO DO ANYTHING TO END YOUR LIFE?: NO

## 2025-02-07 NOTE — ED TRIAGE NOTES
Pt reports 2 weeks of intermittent palpitations with pain in left chest that is worsening. Pt states pain comes with activity and radiates into bilateral shoulders at times. Pt reports having some nasal congestion and cough at times. Is 9 weeks pregnant.      Triage Assessment (Adult)       Row Name 02/06/25 1943          Triage Assessment    Airway WDL WDL        Respiratory WDL    Respiratory WDL WDL        Skin Circulation/Temperature WDL    Skin Circulation/Temperature WDL WDL        Cardiac WDL    Cardiac WDL X;chest pain        Peripheral/Neurovascular WDL    Peripheral Neurovascular WDL WDL        Cognitive/Neuro/Behavioral WDL    Cognitive/Neuro/Behavioral WDL WDL

## 2025-02-07 NOTE — ED PROVIDER NOTES
Emergency Department Note      History of Present Illness     Chief Complaint   Chest Pain and Back Pain      HPI   Jackeline Colin is a 29 year old, 8w6d,  female with a history of asthma and pre-eclampsia who presents to the ED today for evaluation of chest pain and back pain. The patient reports she's had chest tightness with shortness of breath and back pain for the past week. She reports the chest tightness is made worse with walking around or sitting up, but it's better laying down. She mentions she had vaginal spotting twice five days ago. She also reports having pharyngitis four days ago. She mentions some nausea as well. The patient denies any fever, cough, or leg swelling. She also denies any abdominal pain. She denies any current medication use or any known allergies to medications. She hasn't gotten an ultrasound for her pregnancy yet. She states she had a miscarriage in September of last year and has a 3-year-old at home.    Independent Historian   None    Review of External Notes   I reviewed the patient's MIIC.     Past Medical History     Medical History and Problem List   Tonsil abscess  Asthma  PCOS  Pre-eclampsia      Medications   Albuterol  Flovent inhaler  Loratadine     Surgical History   No past surgical history on file.     Physical Exam     Patient Vitals for the past 24 hrs:   BP Temp Temp src Pulse Resp SpO2   25 127/83 98.1  F (36.7  C) Oral 118 20 99 %     Physical Exam  Nursing note and vitals reviewed.  Constitutional:  Oriented to person, place, and time. Cooperative.   HENT:   Nose:    Nose normal.   Mouth/Throat:   Mucous membranes are normal.   Eyes:    Conjunctivae normal and EOM are normal.      Pupils are equal, round, and reactive to light.   Neck:    Trachea normal.   Cardiovascular:  Normal rate, regular rhythm, normal heart sounds and normal pulses. No murmur heard.  Pulmonary/Chest:  Effort normal and breath sounds normal.   Abdominal:   Soft. Normal  "appearance and bowel sounds are normal.      There is no tenderness.      There is no rebound and no CVA tenderness.   Musculoskeletal:  Extremities atraumatic x 4.   Lymphadenopathy:  No cervical adenopathy.   Neurological:   Alert and oriented to person, place, and time. Normal strength.      No cranial nerve deficit or sensory deficit. GCS eye subscore is 4. GCS verbal subscore is 5. GCS motor subscore is 6.   Skin:    Skin is intact. No rash noted.   Psychiatric:   Normal mood and affect.    Diagnostics     Lab Results   Labs Ordered and Resulted from Time of ED Arrival to Time of ED Departure   CBC WITH PLATELETS AND DIFFERENTIAL - Abnormal       Result Value    WBC Count 9.5      RBC Count 5.72 (*)     Hemoglobin 14.8      Hematocrit 43.2      MCV 76 (*)     MCH 25.9 (*)     MCHC 34.3      RDW 13.6      Platelet Count 211      % Neutrophils 78      % Lymphocytes 18      % Monocytes 3      % Eosinophils 1      % Basophils 0      % Immature Granulocytes 0      NRBCs per 100 WBC 0      Absolute Neutrophils 7.4      Absolute Lymphocytes 1.7      Absolute Monocytes 0.3      Absolute Eosinophils 0.1      Absolute Basophils 0.0      Absolute Immature Granulocytes 0.0      Absolute NRBCs 0.0     BASIC METABOLIC PANEL   TROPONIN T, HIGH SENSITIVITY   HEPATIC FUNCTION PANEL   NT PROBNP INPATIENT   D DIMER QUANTITATIVE       Imaging   US Lower Extremity Venous Duplex Bilateral    (Results Pending)       EKG   ECG taken at 1924, ECG read at 1935  Sinus tachycardia  Rightward axis  Cannot rule out anterior infarct, age undetermined    *** as compared to prior, dated 12/19/21.  Rate 109 bpm. WY interval 140 ms. QRS duration 86 ms. QT/QTc 332/447 ms. P-R-T axes 44 94 12.    Independent Interpretation   {IndependentReview:690540::\"None\"}    ED Course      Medications Administered   Medications - No data to display    Procedures   Procedures     Discussion of Management   {Consults/Care Discussions:864949::\"None\"}    ED Course " "  ED Course as of 02/06/25 2117   Thu Feb 06, 2025 2106 I obtained history and examined the patient as noted above.        Additional Documentation  None    Medical Decision Making / Diagnosis     CMS Diagnoses: {Sepsis/Septic Shock/Stemi/Stroke:863204::\"None\"}    MIPS       {EPPA MIPS:738890::\"None\"}    SCOOTER Colin is a 29 year old female ***    Disposition   {EPPAFV Dispo:951085}    Diagnosis   No diagnosis found.     Discharge Medications   New Prescriptions    No medications on file         Scribe Disclosure:  I, Maryam Romero, am serving as a scribe at 9:16 PM on 2/6/2025 to document services personally performed by Nas Mackey MD based on my observations and the provider's statements to me.     " concerns or worsening symptoms.    Disposition   The patient was discharged.     Diagnosis     ICD-10-CM    1. Chest pain, unspecified type  R07.9       2. Shortness of breath  R06.02       3. Pregnant state, incidental  Z33.1            Discharge Medications   Discharge Medication List as of 2/6/2025 11:01 PM            Scribe Disclosure:  I, Maryam Romero, am serving as a scribe at 9:16 PM on 2/6/2025 to document services personally performed by Nas Mackey MD based on my observations and the provider's statements to me.        Nas Mackey MD  02/07/25 0037

## 2025-02-13 LAB
HEPATITIS B SURFACE ANTIGEN (EXTERNAL): NEGATIVE
HIV1+2 AB SERPL QL IA: NONREACTIVE
RUBELLA ANTIBODY IGG (EXTERNAL): NORMAL
TREPONEMA PALLIDUM ANTIBODY (EXTERNAL): NONREACTIVE

## 2025-05-09 ENCOUNTER — HOSPITAL ENCOUNTER (OUTPATIENT)
Facility: CLINIC | Age: 30
Discharge: HOME | End: 2025-05-09
Attending: STUDENT IN AN ORGANIZED HEALTH CARE EDUCATION/TRAINING PROGRAM | Admitting: STUDENT IN AN ORGANIZED HEALTH CARE EDUCATION/TRAINING PROGRAM
Payer: COMMERCIAL

## 2025-05-09 VITALS
RESPIRATION RATE: 16 BRPM | DIASTOLIC BLOOD PRESSURE: 73 MMHG | BODY MASS INDEX: 35.85 KG/M2 | TEMPERATURE: 98.2 F | SYSTOLIC BLOOD PRESSURE: 126 MMHG | HEIGHT: 64 IN | WEIGHT: 210 LBS

## 2025-05-09 PROBLEM — Z36.89 ENCOUNTER FOR TRIAGE IN PREGNANT PATIENT: Status: ACTIVE | Noted: 2025-05-09

## 2025-05-09 PROCEDURE — G0463 HOSPITAL OUTPT CLINIC VISIT: HCPCS

## 2025-05-09 RX ORDER — LIDOCAINE 40 MG/G
CREAM TOPICAL
Status: DISCONTINUED | OUTPATIENT
Start: 2025-05-09 | End: 2025-05-09 | Stop reason: HOSPADM

## 2025-05-09 ASSESSMENT — ACTIVITIES OF DAILY LIVING (ADL): ADLS_ACUITY_SCORE: 15

## 2025-05-09 NOTE — PROGRESS NOTES
"Data: Patient presented to [facility]: 2025  4:26 AM.  Reason for maternal/fetal assessment is assessment of blood pressure and visual disturbances. Patient reports she has had intermittent visual disturbances that last for about 20 minutes and then resolve. She states they look like \"wavy rainbow lines\" and \"circular rainbow that gradually gets bigger until it goes away, in my peripheral vision\". She has had a history of Pre-E with previous pregnancy, she took her blood pressure at home and it was 150/96. She goes to [facility] and nurse line sent her to [facility]. Patient denies uterine contractions, leaking of vaginal fluid/rupture of membranes, vaginal bleeding, abdominal pain, pelvic pressure, nausea, vomiting, headache, epigastric or RUQ pain, significant edema. Patient reports fetal movement is decreased tonight per patient but she states was normal last evening. Patient is a 22w0d .  Prenatal record reviewed. Pregnancy has been complicated by asthma.    Vital signs WNL but blood pressure slightly elevated, will do serial blood pressures. Support person is present.     Action: Verbal consent for EFM. Triage assessment completed.     Response: Patient verbalized agreement with plan. Will contact Dr Zabala who is on for unassigned patients with update and further orders.        "

## 2025-05-09 NOTE — DISCHARGE INSTRUCTIONS
Learning About When to Call Your Doctor During Pregnancy (After 20 Weeks)  Overview  It's common to have concerns about what might be a problem when you're pregnant. Most pregnancies don't have any serious problems. But it's still important to know when to call your doctor if you have certain symptoms or signs of labor.  These are general suggestions. Your doctor may give you some more information about when to call.  When to call your doctor (after 20 weeks)  Call 911  anytime you think you may need emergency care. For example, call if:  You have severe vaginal bleeding. This means you are soaking through a pad each hour for 2 or more hours.  You have sudden, severe pain in your belly.  You have chest pain, are short of breath, or cough up blood.  You passed out (lost consciousness).  You have a seizure.  You see or feel the umbilical cord.  You think you are about to deliver your baby and can't make it safely to the hospital or birthing center.  Call your doctor now or seek immediate medical care if:  You have vaginal bleeding.  You have belly pain.  You have a fever.  You are dizzy or lightheaded, or you feel like you may faint.  You have signs of a blood clot in your leg (called a deep vein thrombosis), such as:  Pain in the calf, back of the knee, thigh, or groin.  Swelling in your leg or groin.  A color change on the leg or groin. The skin may be reddish or purplish, depending on your usual skin color.  You have symptoms of preeclampsia, such as:  Sudden swelling of your face, hands, or feet.  New vision problems (such as dimness, blurring, or seeing spots).  A severe headache.  You have a sudden release of fluid from your vagina. (You think your water broke.)  You've been having regular contractions for an hour. This means that you've had at least 6 contractions within 1 hour, even after you change your position and drink fluids.  You notice that your baby has stopped moving or is moving less than  "normal.  You have signs of heart failure, such as:  New or increased shortness of breath.  New or worse swelling in your legs, ankles, or feet.  Sudden weight gain, such as more than 2 to 3 pounds in a day or 5 pounds in a week.  Feeling so tired or weak that you cannot do your usual activities.  You have symptoms of a urinary tract infection. These may include:  Pain or burning when you urinate.  A frequent need to urinate without being able to pass much urine.  Pain in the flank, which is just below the rib cage and above the waist on either side of the back.  Blood in your urine.  Watch closely for changes in your health, and be sure to contact your doctor if:  You have vaginal discharge that smells bad.  You feel sad, anxious, or hopeless for more than a few days.  You have skin changes, such as a rash, itching, or a yellow color to your skin.  You have other concerns about your pregnancy.  If you have labor signs at 37 weeks or more  If you have signs of labor at 37 weeks or more, your doctor may tell you to call when your labor becomes more active. Symptoms of active labor include:  Contractions that are regular.  Contractions that are less than 5 minutes apart.  Contractions that are hard to talk through.  Follow-up care is a key part of your treatment and safety. Be sure to make and go to all appointments, and call your doctor if you are having problems. It's also a good idea to know your test results and keep a list of the medicines you take.  Where can you learn more?  Go to https://www.[employer].net/patiented  Enter N531 in the search box to learn more about \"Learning About When to Call Your Doctor During Pregnancy (After 20 Weeks).\"  Current as of: April 30, 2024  Content Version: 14.4    5428-3449 [facility] [employer].   Care instructions adapted under license by your healthcare professional. If you have questions about a medical condition or this instruction, always ask your healthcare professional. " [employer], [facility] disclaims any warranty or liability for your use of this information.

## 2025-05-09 NOTE — PROVIDER NOTIFICATION
05/09/25 0449   Provider Notification   Provider Name/Title Dr Zabala   Method of Notification Electronic Page;Phone   Request Evaluate - Remote   Notification Reason Patient Arrived;Status Update     Discussed with provider that patient has arrived and reported patient complaints/reason for assessment. Discussed BPs taken until this time as well as visual disturbances that have happened tonight. Discussed FHR found with doppler as charted in flow sheet. Per provider patient is to be discharged home and to follow up with her primary care provider in the morning, calling first thing, and she also suggests following up with eye doctor as well. Discharge order received.

## 2025-05-09 NOTE — PROGRESS NOTES
Pt and spouse agreeable to plan for discharge home after reassurance of FHR and decreased blood pressure readings. Pt advised to see eye doctor and she agrees to do so. She states her primary OB has also suggested the same. Discharge paperwork discussed and pt discharges home, ambulatory, with spouse at 0509.

## 2025-05-17 ENCOUNTER — HOSPITAL ENCOUNTER (OUTPATIENT)
Facility: CLINIC | Age: 30
Discharge: HOME | End: 2025-05-17
Attending: OBSTETRICS & GYNECOLOGY | Admitting: OBSTETRICS & GYNECOLOGY
Payer: COMMERCIAL

## 2025-05-17 VITALS — DIASTOLIC BLOOD PRESSURE: 79 MMHG | SYSTOLIC BLOOD PRESSURE: 129 MMHG | RESPIRATION RATE: 19 BRPM | TEMPERATURE: 97.9 F

## 2025-05-17 PROCEDURE — G0463 HOSPITAL OUTPT CLINIC VISIT: HCPCS

## 2025-05-17 PROCEDURE — 250N000013 HC RX MED GY IP 250 OP 250 PS 637: Performed by: OBSTETRICS & GYNECOLOGY

## 2025-05-17 RX ORDER — ACETAMINOPHEN 325 MG/1
TABLET ORAL
Status: COMPLETED
Start: 2025-05-17 | End: 2025-05-17

## 2025-05-17 RX ORDER — ACETAMINOPHEN 325 MG/1
650 TABLET ORAL EVERY 4 HOURS PRN
Status: DISCONTINUED | OUTPATIENT
Start: 2025-05-17 | End: 2025-05-17 | Stop reason: HOSPADM

## 2025-05-17 RX ORDER — HYDROXYZINE HYDROCHLORIDE 50 MG/1
50 TABLET, FILM COATED ORAL ONCE
Status: COMPLETED | OUTPATIENT
Start: 2025-05-17 | End: 2025-05-17

## 2025-05-17 RX ORDER — LIDOCAINE 40 MG/G
CREAM TOPICAL
Status: DISCONTINUED | OUTPATIENT
Start: 2025-05-17 | End: 2025-05-17 | Stop reason: HOSPADM

## 2025-05-17 RX ORDER — HYDROXYZINE HYDROCHLORIDE 50 MG/1
TABLET, FILM COATED ORAL
Status: COMPLETED
Start: 2025-05-17 | End: 2025-05-17

## 2025-05-17 RX ADMIN — HYDROXYZINE HYDROCHLORIDE 50 MG: 50 TABLET, FILM COATED ORAL at 20:11

## 2025-05-17 RX ADMIN — ACETAMINOPHEN 650 MG: 325 TABLET, FILM COATED ORAL at 20:10

## 2025-05-17 RX ADMIN — ACETAMINOPHEN 650 MG: 325 TABLET ORAL at 20:10

## 2025-05-17 RX ADMIN — HYDROXYZINE HYDROCHLORIDE 50 MG: 50 TABLET ORAL at 20:11

## 2025-05-17 ASSESSMENT — ACTIVITIES OF DAILY LIVING (ADL)
DIFFICULTY_EATING/SWALLOWING: NO
TOILETING_ISSUES: NO
FALL_HISTORY_WITHIN_LAST_SIX_MONTHS: NO
CONCENTRATING,_REMEMBERING_OR_MAKING_DECISIONS_DIFFICULTY: NO
HEARING_DIFFICULTY_OR_DEAF: NO
ADLS_ACUITY_SCORE: 15
WEAR_GLASSES_OR_BLIND: NO
DOING_ERRANDS_INDEPENDENTLY_DIFFICULTY: NO
WALKING_OR_CLIMBING_STAIRS_DIFFICULTY: NO
DRESSING/BATHING_DIFFICULTY: NO
DIFFICULTY_COMMUNICATING: NO

## 2025-05-18 NOTE — PROGRESS NOTES
Data: Patient presented to [facility]: 2025  7:41 PM.  Reason for maternal/fetal assessment is abdominal pain. Patient reports she had an argument with significant other and after this noted abdominal cramping. Patient states argument was only verbal. Patient denies leaking of vaginal fluid/rupture of membranes, vaginal bleeding, nausea, vomiting, visual disturbances, epigastric or RUQ pain. Patient reports fetal movement is normal. Patient is a 23w1d .  Prenatal record reviewed. Pregnancy has been uncomplicated.    Vital signs wnl. Support person is present.     Action: Verbal consent for EFM. Triage assessment completed.     Response: Patient verbalized agreement with plan. Will contact Dr Mendoza with update and further orders.

## 2025-05-18 NOTE — DISCHARGE INSTRUCTIONS
Learning About When to Call Your Doctor During Pregnancy (After 20 Weeks)  Overview  It's common to have concerns about what might be a problem when you're pregnant. Most pregnancies don't have any serious problems. But it's still important to know when to call your doctor if you have certain symptoms or signs of labor.  These are general suggestions. Your doctor may give you some more information about when to call.  When to call your doctor (after 20 weeks)  Call 911  anytime you think you may need emergency care. For example, call if:  You have severe vaginal bleeding. This means you are soaking through a pad each hour for 2 or more hours.  You have sudden, severe pain in your belly.  You have chest pain, are short of breath, or cough up blood.  You passed out (lost consciousness).  You have a seizure.  You see or feel the umbilical cord.  You think you are about to deliver your baby and can't make it safely to the hospital or birthing center.  Call your doctor now or seek immediate medical care if:  You have vaginal bleeding.  You have belly pain.  You have a fever.  You are dizzy or lightheaded, or you feel like you may faint.  You have signs of a blood clot in your leg (called a deep vein thrombosis), such as:  Pain in the calf, back of the knee, thigh, or groin.  Swelling in your leg or groin.  A color change on the leg or groin. The skin may be reddish or purplish, depending on your usual skin color.  You have symptoms of preeclampsia, such as:  Sudden swelling of your face, hands, or feet.  New vision problems (such as dimness, blurring, or seeing spots).  A severe headache.  You have a sudden release of fluid from your vagina. (You think your water broke.)  You've been having regular contractions for an hour. This means that you've had at least 6 contractions within 1 hour, even after you change your position and drink fluids.  You notice that your baby has stopped moving or is moving less than  "normal.  You have signs of heart failure, such as:  New or increased shortness of breath.  New or worse swelling in your legs, ankles, or feet.  Sudden weight gain, such as more than 2 to 3 pounds in a day or 5 pounds in a week.  Feeling so tired or weak that you cannot do your usual activities.  You have symptoms of a urinary tract infection. These may include:  Pain or burning when you urinate.  A frequent need to urinate without being able to pass much urine.  Pain in the flank, which is just below the rib cage and above the waist on either side of the back.  Blood in your urine.  Watch closely for changes in your health, and be sure to contact your doctor if:  You have vaginal discharge that smells bad.  You feel sad, anxious, or hopeless for more than a few days.  You have skin changes, such as a rash, itching, or a yellow color to your skin.  You have other concerns about your pregnancy.  If you have labor signs at 37 weeks or more  If you have signs of labor at 37 weeks or more, your doctor may tell you to call when your labor becomes more active. Symptoms of active labor include:  Contractions that are regular.  Contractions that are less than 5 minutes apart.  Contractions that are hard to talk through.  Follow-up care is a key part of your treatment and safety. Be sure to make and go to all appointments, and call your doctor if you are having problems. It's also a good idea to know your test results and keep a list of the medicines you take.  Where can you learn more?  Go to https://www.[employer].net/patiented  Enter N531 in the search box to learn more about \"Learning About When to Call Your Doctor During Pregnancy (After 20 Weeks).\"  Current as of: April 30, 2024  Content Version: 14.4    6563-6316 [facility] [employer].   Care instructions adapted under license by your healthcare professional. If you have questions about a medical condition or this instruction, always ask your healthcare professional. " [employer], [facility] disclaims any warranty or liability for your use of this information.

## 2025-05-18 NOTE — PROGRESS NOTES
Data: Patient assessed in the [facility] for abdominal pain. Cervical exam deferred. Membranes intact. Contractions are not present. See flowsheets for fetal assessment documentation.     Action: Presumed adequate fetal oxygenation documented. Discharge instructions reviewed. Patient instructed to report change in fetal movement, vaginal leaking of fluid or bleeding, abdominal pain, or any concerns related to the pregnancy to provider/clinic.      Response: Orders to discharge home per Dr Mendoza. Patient verbalized understanding of education and agreement with plan. Discharged to home.

## 2025-05-29 ENCOUNTER — HOSPITAL ENCOUNTER (OUTPATIENT)
Facility: CLINIC | Age: 30
Discharge: HOME | End: 2025-05-29
Attending: OBSTETRICS & GYNECOLOGY | Admitting: OBSTETRICS & GYNECOLOGY
Payer: COMMERCIAL

## 2025-05-29 VITALS — RESPIRATION RATE: 18 BRPM | SYSTOLIC BLOOD PRESSURE: 120 MMHG | DIASTOLIC BLOOD PRESSURE: 75 MMHG

## 2025-05-29 DIAGNOSIS — Z36.89 ENCOUNTER FOR TRIAGE IN PREGNANT PATIENT: Primary | ICD-10-CM

## 2025-05-29 LAB
ALBUMIN UR-MCNC: 30 MG/DL
APPEARANCE UR: ABNORMAL
BILIRUB UR QL STRIP: NEGATIVE
COLOR UR AUTO: YELLOW
GLUCOSE UR STRIP-MCNC: NEGATIVE MG/DL
HGB UR QL STRIP: NEGATIVE
KETONES UR STRIP-MCNC: 40 MG/DL
LEUKOCYTE ESTERASE UR QL STRIP: ABNORMAL
MUCOUS THREADS #/AREA URNS LPF: PRESENT /LPF
NITRATE UR QL: NEGATIVE
PH UR STRIP: 7 [PH] (ref 5–7)
RBC URINE: 3 /HPF
SP GR UR STRIP: 1.02 (ref 1–1.03)
SQUAMOUS EPITHELIAL: 63 /HPF
UROBILINOGEN UR STRIP-MCNC: 2 MG/DL
WBC URINE: 20 /HPF

## 2025-05-29 PROCEDURE — 81001 URINALYSIS AUTO W/SCOPE: CPT | Performed by: OBSTETRICS & GYNECOLOGY

## 2025-05-29 PROCEDURE — 87086 URINE CULTURE/COLONY COUNT: CPT | Performed by: OBSTETRICS & GYNECOLOGY

## 2025-05-29 PROCEDURE — G0463 HOSPITAL OUTPT CLINIC VISIT: HCPCS

## 2025-05-29 PROCEDURE — 250N000011 HC RX IP 250 OP 636: Performed by: OBSTETRICS & GYNECOLOGY

## 2025-05-29 RX ORDER — LIDOCAINE 40 MG/G
CREAM TOPICAL
Status: DISCONTINUED | OUTPATIENT
Start: 2025-05-29 | End: 2025-05-29 | Stop reason: HOSPADM

## 2025-05-29 RX ORDER — ONDANSETRON 4 MG/1
4 TABLET, FILM COATED ORAL EVERY 6 HOURS PRN
Status: DISCONTINUED | OUTPATIENT
Start: 2025-05-29 | End: 2025-05-29

## 2025-05-29 RX ORDER — ONDANSETRON 4 MG/1
4 TABLET, ORALLY DISINTEGRATING ORAL EVERY 6 HOURS PRN
Qty: 20 TABLET | Refills: 0 | Status: SHIPPED | OUTPATIENT
Start: 2025-05-29

## 2025-05-29 RX ORDER — ONDANSETRON 4 MG/1
4 TABLET, FILM COATED ORAL EVERY 6 HOURS PRN
Status: DISCONTINUED | OUTPATIENT
Start: 2025-05-29 | End: 2025-05-29 | Stop reason: HOSPADM

## 2025-05-29 RX ADMIN — ONDANSETRON HYDROCHLORIDE 4 MG: 4 TABLET, FILM COATED ORAL at 20:19

## 2025-05-29 ASSESSMENT — ACTIVITIES OF DAILY LIVING (ADL)
DOING_ERRANDS_INDEPENDENTLY_DIFFICULTY: NO
FALL_HISTORY_WITHIN_LAST_SIX_MONTHS: NO
DRESSING/BATHING_DIFFICULTY: NO
ADLS_ACUITY_SCORE: 15
TOILETING_ISSUES: NO
CHANGE_IN_FUNCTIONAL_STATUS_SINCE_ONSET_OF_CURRENT_ILLNESS/INJURY: NO
CONCENTRATING,_REMEMBERING_OR_MAKING_DECISIONS_DIFFICULTY: NO
WALKING_OR_CLIMBING_STAIRS_DIFFICULTY: NO
WEAR_GLASSES_OR_BLIND: NO
DIFFICULTY_COMMUNICATING: NO
DIFFICULTY_EATING/SWALLOWING: NO
HEARING_DIFFICULTY_OR_DEAF: NO

## 2025-05-30 NOTE — PROGRESS NOTES
Data: Patient assessed in the [facility] for nausea and vomiting. Cervical exam deferred. Membranes intact. Contractions are not present. See flowsheets for fetal assessment documentation.     Action: Presumed adequate fetal oxygenation documented. Discharge instructions reviewed. Patient instructed to report change in fetal movement, vaginal leaking of fluid or bleeding, abdominal pain, or any concerns related to the pregnancy to provider/clinic.      Response: Orders to discharge home per Dr. Medina. Patient verbalized understanding of education and agreement with plan. Discharged to home at 2023.

## 2025-05-30 NOTE — PROGRESS NOTES
Data: Patient presented to [facility]: 2025  7:15 PM.  Reason for maternal/fetal assessment is abdominal pain. Patient reports generalized discomfort similar to previous gallbladder pain over the last few days. Patient denies uterine contractions, leaking of vaginal fluid/rupture of membranes, vaginal bleeding. Patient reports fetal movement is normal. Patient is a 24w6d .  Prenatal record reviewed. Pregnancy has been uncomplicated.    Vital signs wnl. Support person is present.     Action: Verbal consent for EFM. Triage assessment completed.     Response: Patient verbalized agreement with plan. Will contact Dr Crowe with update and further orders.

## 2025-05-31 LAB — BACTERIA UR CULT: NORMAL
